# Patient Record
Sex: MALE | Race: ASIAN | NOT HISPANIC OR LATINO
[De-identification: names, ages, dates, MRNs, and addresses within clinical notes are randomized per-mention and may not be internally consistent; named-entity substitution may affect disease eponyms.]

---

## 2022-01-11 PROBLEM — Z00.00 ENCOUNTER FOR PREVENTIVE HEALTH EXAMINATION: Status: ACTIVE | Noted: 2022-01-11

## 2022-01-12 ENCOUNTER — APPOINTMENT (OUTPATIENT)
Dept: CARDIOLOGY | Facility: CLINIC | Age: 72
End: 2022-01-12
Payer: MEDICARE

## 2022-01-12 ENCOUNTER — NON-APPOINTMENT (OUTPATIENT)
Age: 72
End: 2022-01-12

## 2022-01-12 VITALS
WEIGHT: 186 LBS | TEMPERATURE: 98.1 F | RESPIRATION RATE: 18 BRPM | DIASTOLIC BLOOD PRESSURE: 85 MMHG | SYSTOLIC BLOOD PRESSURE: 123 MMHG | HEIGHT: 67.32 IN | BODY MASS INDEX: 28.85 KG/M2 | HEART RATE: 92 BPM | OXYGEN SATURATION: 97 %

## 2022-01-12 DIAGNOSIS — Z78.9 OTHER SPECIFIED HEALTH STATUS: ICD-10-CM

## 2022-01-12 DIAGNOSIS — Z82.3 FAMILY HISTORY OF STROKE: ICD-10-CM

## 2022-01-12 DIAGNOSIS — E78.5 HYPERLIPIDEMIA, UNSPECIFIED: ICD-10-CM

## 2022-01-12 DIAGNOSIS — Z87.891 PERSONAL HISTORY OF NICOTINE DEPENDENCE: ICD-10-CM

## 2022-01-12 PROCEDURE — 93015 CV STRESS TEST SUPVJ I&R: CPT

## 2022-01-12 PROCEDURE — 93306 TTE W/DOPPLER COMPLETE: CPT

## 2022-01-12 PROCEDURE — 99204 OFFICE O/P NEW MOD 45 MIN: CPT | Mod: 25

## 2022-01-12 PROCEDURE — 93000 ELECTROCARDIOGRAM COMPLETE: CPT | Mod: 59

## 2022-01-12 RX ORDER — TRAZODONE HYDROCHLORIDE 50 MG/1
50 TABLET ORAL
Refills: 0 | Status: ACTIVE | COMMUNITY

## 2022-01-12 RX ORDER — FINASTERIDE 5 MG/1
5 TABLET, FILM COATED ORAL
Refills: 0 | Status: ACTIVE | COMMUNITY

## 2022-01-12 RX ORDER — FENOFIBRATE 54 MG/1
54 TABLET ORAL
Refills: 0 | Status: ACTIVE | COMMUNITY

## 2022-01-12 RX ORDER — TAMSULOSIN HYDROCHLORIDE 0.4 MG/1
0.4 CAPSULE ORAL
Refills: 0 | Status: ACTIVE | COMMUNITY

## 2022-01-12 NOTE — HISTORY OF PRESENT ILLNESS
[FreeTextEntry1] : 71 year-old male with aortic aneurysm, HLD presents for evaluation of CP.  Patient reports that for the past 2 years he has been experiencing SSCP radiating to the neck, described as tightness,  worse with exertion, relieved with rest, lasting minutes.  Patient reports REYES.  Patient reports palpitations with exertion.  Patient denies h/o syncope.

## 2022-01-12 NOTE — PHYSICAL EXAM
[Well Developed] : well developed [Well Nourished] : well nourished [No Acute Distress] : no acute distress [Normal Conjunctiva] : normal conjunctiva [Normal Venous Pressure] : normal venous pressure [No Carotid Bruit] : no carotid bruit [Normal S1, S2] : normal S1, S2 [No Murmur] : no murmur [No Rub] : no rub [No Gallop] : no gallop [Clear Lung Fields] : clear lung fields [Good Air Entry] : good air entry [No Respiratory Distress] : no respiratory distress  [Soft] : abdomen soft [Non Tender] : non-tender [No Masses/organomegaly] : no masses/organomegaly [Normal Bowel Sounds] : normal bowel sounds [Normal Gait] : normal gait [No Cyanosis] : no cyanosis [No Clubbing] : no clubbing [No Varicosities] : no varicosities [Moves all extremities] : moves all extremities [No Focal Deficits] : no focal deficits [Normal Speech] : normal speech [Alert and Oriented] : alert and oriented [Normal memory] : normal memory [de-identified] : Trace edema noted.

## 2022-01-26 ENCOUNTER — APPOINTMENT (OUTPATIENT)
Dept: CARDIOLOGY | Facility: CLINIC | Age: 72
End: 2022-01-26
Payer: MEDICARE

## 2022-01-26 VITALS
TEMPERATURE: 97.7 F | WEIGHT: 186 LBS | HEART RATE: 77 BPM | SYSTOLIC BLOOD PRESSURE: 128 MMHG | RESPIRATION RATE: 18 BRPM | OXYGEN SATURATION: 94 % | DIASTOLIC BLOOD PRESSURE: 79 MMHG | BODY MASS INDEX: 28.85 KG/M2

## 2022-01-26 PROCEDURE — 99213 OFFICE O/P EST LOW 20 MIN: CPT

## 2022-02-08 NOTE — PHYSICAL EXAM
[Well Developed] : well developed [Well Nourished] : well nourished [No Acute Distress] : no acute distress [Normal Conjunctiva] : normal conjunctiva [Normal Venous Pressure] : normal venous pressure [No Carotid Bruit] : no carotid bruit [Normal S1, S2] : normal S1, S2 [No Murmur] : no murmur [No Rub] : no rub [No Gallop] : no gallop [Clear Lung Fields] : clear lung fields [Good Air Entry] : good air entry [No Respiratory Distress] : no respiratory distress  [Soft] : abdomen soft [Non Tender] : non-tender [No Masses/organomegaly] : no masses/organomegaly [Normal Bowel Sounds] : normal bowel sounds [Normal Gait] : normal gait [No Cyanosis] : no cyanosis [No Clubbing] : no clubbing [No Varicosities] : no varicosities [Moves all extremities] : moves all extremities [No Focal Deficits] : no focal deficits [Normal Speech] : normal speech [Alert and Oriented] : alert and oriented [Normal memory] : normal memory [de-identified] : Trace edema noted.

## 2022-02-08 NOTE — HISTORY OF PRESENT ILLNESS
[FreeTextEntry1] : 1/26/22 - Patient still reports CP and SOB going upstairs.  I advised patient to start NTPatch.  I will consider cardiac cath if his symptom persists.\par \par 1/12/22 - Patient reports that for the past 2 years he has been experiencing SSCP radiating to the neck, described as tightness,  worse with exertion, relieved with rest, lasting minutes.  Patient reports REYES.  Patient reports palpitations with exertion.  Patient denies h/o syncope.

## 2022-02-08 NOTE — REASON FOR VISIT
[FreeTextEntry1] : 71 year-old male with aortic aneurysm (4.6 cm), HLD presents for followup.  \par \par Patient was last seen on 1/26/22.   I advised patient to start [facility].  \par \par He is on ASA 81 mg and Atorvastatin 10 mg for CAD.  He is on Metoprolol ER 25 mg. \par \par Patient underwent an echocardiogram 1/12/22 and it showed normal LV function with moderate dilatation of the ascending aorta (4.6 cm).  \par \par Patient underwent a treadmill stress test 1/12/22 and completed 3 minutes of Sam protocol.  There were upsloping ST depressions on ECG but no symptoms.  Following treadmill stress, there was echocardiographic evidence of basal inferolateral ischemia.

## 2022-02-09 NOTE — PHYSICAL EXAM
[Well Developed] : well developed [Well Nourished] : well nourished [No Acute Distress] : no acute distress [Normal Conjunctiva] : normal conjunctiva [Normal Venous Pressure] : normal venous pressure [No Carotid Bruit] : no carotid bruit [Normal S1, S2] : normal S1, S2 [No Murmur] : no murmur [No Rub] : no rub [No Gallop] : no gallop [Clear Lung Fields] : clear lung fields [Good Air Entry] : good air entry [No Respiratory Distress] : no respiratory distress  [Soft] : abdomen soft [Non Tender] : non-tender [No Masses/organomegaly] : no masses/organomegaly [Normal Bowel Sounds] : normal bowel sounds [Normal Gait] : normal gait [No Cyanosis] : no cyanosis [No Clubbing] : no clubbing [No Varicosities] : no varicosities [Moves all extremities] : moves all extremities [No Focal Deficits] : no focal deficits [Normal Speech] : normal speech [Alert and Oriented] : alert and oriented [Normal memory] : normal memory [de-identified] : Trace edema noted.

## 2022-02-09 NOTE — REASON FOR VISIT
[FreeTextEntry1] : 71 year-old male with aortic aneurysm (4.6 cm), HLD presents for followup.  \par \par Patient was last seen on 1/12/22 for evaluation of CP.  Patient underwent an echocardiogram and it showed normal LV function with moderate dilatation of the ascending aorta (4.6 cm).  Patient underwent a treadmill stress test and completed 3 minutes of Sam protocol.  There were upsloping ST depressions on ECG but no symptoms.  Following treadmill stress, there was echocardiographic evidence of basal inferolateral ischemia. \par \par He is on ASA 81 mg and Atorvastatin 10 mg for CAD.  He is on Metoprolol ER 25 mg.

## 2022-02-14 ENCOUNTER — APPOINTMENT (OUTPATIENT)
Dept: CARDIOLOGY | Facility: CLINIC | Age: 72
End: 2022-02-14
Payer: MEDICARE

## 2022-02-14 VITALS
HEART RATE: 95 BPM | WEIGHT: 186 LBS | RESPIRATION RATE: 18 BRPM | SYSTOLIC BLOOD PRESSURE: 150 MMHG | TEMPERATURE: 97.2 F | BODY MASS INDEX: 28.85 KG/M2 | OXYGEN SATURATION: 95 % | DIASTOLIC BLOOD PRESSURE: 101 MMHG

## 2022-02-14 PROCEDURE — 99213 OFFICE O/P EST LOW 20 MIN: CPT

## 2022-04-13 ENCOUNTER — APPOINTMENT (OUTPATIENT)
Dept: CARDIOLOGY | Facility: CLINIC | Age: 72
End: 2022-04-13
Payer: MEDICARE

## 2022-04-13 VITALS
RESPIRATION RATE: 18 BRPM | WEIGHT: 181 LBS | DIASTOLIC BLOOD PRESSURE: 81 MMHG | BODY MASS INDEX: 28.08 KG/M2 | SYSTOLIC BLOOD PRESSURE: 120 MMHG | OXYGEN SATURATION: 97 % | HEART RATE: 74 BPM | TEMPERATURE: 98.5 F

## 2022-04-13 PROCEDURE — 99213 OFFICE O/P EST LOW 20 MIN: CPT

## 2022-04-13 NOTE — REASON FOR VISIT
[FreeTextEntry1] : 71 year-old male with possible CAD (abnormal stress echo), aortic aneurysm (4.6 cm), HTN, HLD presents for followup.  \par \par Patient was last seen on 2/14/22 for CP and SOB with exertion.  His BP and HR were elevated today.  I advised patient to increase Metoprolol ER to 50 mg.  \par \par He is on ASA 81 mg and Atorvastatin 10 mg for CAD.  He is on Metoprolol ER 50 mg for HTN.  He is on NTPatch for CP.\par \par Patient underwent an echocardiogram 1/12/22 and it showed normal LV function with moderate dilatation of the ascending aorta (4.6 cm).  \par \par Patient underwent a treadmill stress test 1/12/22 and completed 3 minutes of Sam protocol.  There were upsloping ST depressions on ECG but no symptoms.  Following treadmill stress, there was echocardiographic evidence of basal inferolateral ischemia.

## 2022-04-13 NOTE — HISTORY OF PRESENT ILLNESS
[FreeTextEntry1] : 4/13/22 - Patient reports CP and SOB with exertion despite medications.  Patient would like to proceed with cardiac cath.  I advised patient to continue current medications.  \par \par 2/14/22 - Patient reports CP and SOB when walking upstairs or walking fast.  He is using NTPatch daily.  His BP and HR were elevated today.  I advised patient to increase Metoprolol ER to 50 mg.  FU 2 months. \par \par 1/26/22 - Patient still reports CP and SOB going upstairs.  I advised patient to start NTPatch.  I will consider cardiac cath if his symptom persists.\par \par 1/12/22 - Patient reports that for the past 2 years he has been experiencing SSCP radiating to the neck, described as tightness,  worse with exertion, relieved with rest, lasting minutes.  Patient reports REYES.  Patient reports palpitations with exertion.  Patient denies h/o syncope.

## 2022-04-13 NOTE — PHYSICAL EXAM
[Well Developed] : well developed [Well Nourished] : well nourished [No Acute Distress] : no acute distress [Normal Conjunctiva] : normal conjunctiva [Normal Venous Pressure] : normal venous pressure [No Carotid Bruit] : no carotid bruit [Normal S1, S2] : normal S1, S2 [No Murmur] : no murmur [No Rub] : no rub [No Gallop] : no gallop [Clear Lung Fields] : clear lung fields [Good Air Entry] : good air entry [No Respiratory Distress] : no respiratory distress  [Soft] : abdomen soft [Non Tender] : non-tender [No Masses/organomegaly] : no masses/organomegaly [Normal Bowel Sounds] : normal bowel sounds [Normal Gait] : normal gait [No Cyanosis] : no cyanosis [No Clubbing] : no clubbing [No Varicosities] : no varicosities [Moves all extremities] : moves all extremities [No Focal Deficits] : no focal deficits [Normal Speech] : normal speech [Alert and Oriented] : alert and oriented [Normal memory] : normal memory [de-identified] : Trace edema noted.

## 2022-04-13 NOTE — DISCUSSION/SUMMARY
[FreeTextEntry1] : 71 year-old male with possible CAD (abnormal stress echo), aortic aneurysm (4.6 cm), HTN, HLD presents for followup.  \par \par Patient was last seen on 2/14/22 for CP and SOB with exertion.  His BP and HR were elevated today.  I advised patient to increase Metoprolol ER to 50 mg.  \par \par He is on ASA 81 mg and Atorvastatin 10 mg for CAD.  He is on Metoprolol ER 50 mg for HTN.  He is on NTPatch for CP.\par \par Patient underwent an echocardiogram 1/12/22 and it showed normal LV function with moderate dilatation of the ascending aorta (4.6 cm).  \par \par Patient underwent a treadmill stress test 1/12/22 and completed 3 minutes of Sam protocol.  There were upsloping ST depressions on ECG but no symptoms.  Following treadmill stress, there was echocardiographic evidence of basal inferolateral ischemia. \par \par 4/13/22 - Patient reports CP and SOB with exertion despite medications.  Patient would like to proceed with cardiac cath.  I advised patient to continue current medications.  \par \par (1) CP and SOB with exertion, abnormal stress test - Patient reports CP and SOB with exertion despite medications.  Patient would like to proceed with cardiac cath.  Patient requests [facility].  I advised patient to continue ASA 81 mg, Atorvastatin 10 mg, Metoprolol ER 50 mg, and NTPatch.\par \par (2) Followup - pending cardiac cath.

## 2022-04-27 ENCOUNTER — NON-APPOINTMENT (OUTPATIENT)
Age: 72
End: 2022-04-27

## 2022-04-30 PROBLEM — R94.39 ABNORMAL CARDIOVASCULAR STRESS TEST: Status: ACTIVE | Noted: 2022-02-08

## 2022-05-02 ENCOUNTER — APPOINTMENT (OUTPATIENT)
Dept: CARDIOLOGY | Facility: CLINIC | Age: 72
End: 2022-05-02
Payer: MEDICARE

## 2022-05-02 VITALS
DIASTOLIC BLOOD PRESSURE: 74 MMHG | SYSTOLIC BLOOD PRESSURE: 109 MMHG | WEIGHT: 189 LBS | OXYGEN SATURATION: 95 % | BODY MASS INDEX: 29.32 KG/M2 | TEMPERATURE: 98.4 F | HEART RATE: 75 BPM | RESPIRATION RATE: 18 BRPM

## 2022-05-02 DIAGNOSIS — R94.39 ABNORMAL RESULT OF OTHER CARDIOVASCULAR FUNCTION STUDY: ICD-10-CM

## 2022-05-02 PROCEDURE — 99213 OFFICE O/P EST LOW 20 MIN: CPT

## 2022-05-03 NOTE — HISTORY OF PRESENT ILLNESS
[FreeTextEntry1] : 5/2/22 - Patient reports decreased CP following stents.  I advised patient to continue ASA 81 mg and Plavix 75 mg.  I advised patient to increase Atorvastatin to 40 mg.  He may stop NTPatch and assess his symptoms.  FU 3 weeks.\par \par 4/13/22 - Patient reports CP and SOB with exertion despite medications.  Patient would like to proceed with cardiac cath.  I advised patient to continue current medications.  Cardiac cath today at [facility] with Dr. Arrington showed 70-80% pLAD, 70% mLAD, 95% pRCA, 99% mRCA, LVEDP 22 mmHg. Patient underwent RCA stents x 2. to consider atherectomy of LAD (heavily calcified). \par \par 2/14/22 - Patient reports CP and SOB when walking upstairs or walking fast.  He is using NTPatch daily.  His BP and HR were elevated today.  I advised patient to increase Metoprolol ER to 50 mg.  FU 2 months. \par \par 1/26/22 - Patient still reports CP and SOB going upstairs.  I advised patient to start NTPatch.  I will consider cardiac cath if his symptom persists.\par \par 1/12/22 - Patient reports that for the past 2 years he has been experiencing SSCP radiating to the neck, described as tightness,  worse with exertion, relieved with rest, lasting minutes.  Patient reports REYES.  Patient reports palpitations with exertion.  Patient denies h/o syncope.

## 2022-05-03 NOTE — PHYSICAL EXAM
[Well Developed] : well developed [Well Nourished] : well nourished [No Acute Distress] : no acute distress [Normal Conjunctiva] : normal conjunctiva [Normal Venous Pressure] : normal venous pressure [No Carotid Bruit] : no carotid bruit [Normal S1, S2] : normal S1, S2 [No Murmur] : no murmur [No Rub] : no rub [No Gallop] : no gallop [Clear Lung Fields] : clear lung fields [Good Air Entry] : good air entry [No Respiratory Distress] : no respiratory distress  [Soft] : abdomen soft [Non Tender] : non-tender [No Masses/organomegaly] : no masses/organomegaly [Normal Bowel Sounds] : normal bowel sounds [Normal Gait] : normal gait [No Cyanosis] : no cyanosis [No Clubbing] : no clubbing [No Varicosities] : no varicosities [Moves all extremities] : moves all extremities [No Focal Deficits] : no focal deficits [Normal Speech] : normal speech [Normal memory] : normal memory [Alert and Oriented] : alert and oriented [de-identified] : Trace edema noted.

## 2022-05-03 NOTE — REASON FOR VISIT
[FreeTextEntry1] : 71 year-old male with CAD (70-80% pLAD, 70% mLAD, 95% pRCA, 99% mRCA) s/p 2 RCA stents at [facility] on 4/27/22,, aortic aneurysm (4.6 cm), HTN, HLD presents for followup.  \par \par Patient was last seen on 4/13/22 for CP and SOB with exertion despite medications.  Patient would like to proceed with cardiac cath.  Cardiac cath today at [facility] with Dr. Arrington showed 70-80% pLAD, 70% mLAD, 95% pRCA, 99% mRCA, LVEDP 22 mmHg. Patient underwent RCA stents x 2. to consider atherectomy of LAD (heavily calcified). \par \par He is on ASA 81 mg and Atorvastatin 10 mg for CAD.  He is on Metoprolol ER 50 mg for HTN.  He is on NTPatch for CP.\par \par Patient underwent an echocardiogram 1/12/22 and it showed normal LV function with moderate dilatation of the ascending aorta (4.6 cm).  \par \par Patient underwent a treadmill stress test 1/12/22 and completed 3 minutes of Sam protocol.  There were upsloping ST depressions on ECG but no symptoms.  Following treadmill stress, there was echocardiographic evidence of basal inferolateral ischemia.

## 2022-05-24 NOTE — HISTORY OF PRESENT ILLNESS
[FreeTextEntry1] : 5/2/22 - Patient reports decreased CP following stents.  I advised patient to continue ASA 81 mg and Plavix 75 mg.  I advised patient to increase Atorvastatin to 40 mg.  He may stop NTPatch and assess his symptoms.  FU 3 weeks.\par \par 4/13/22 - Patient reports CP and SOB with exertion despite medications.  Patient would like to proceed with cardiac cath.  I advised patient to continue current medications.  Cardiac cath today at [facility] with Dr. Arrington showed 70-80% pLAD, 70% mLAD, 95% pRCA, 99% mRCA, LVEDP 22 mmHg. Patient underwent RCA stents x 2. to consider atherectomy of LAD (heavily calcified). \par \par 2/14/22 - Patient reports CP and SOB when walking upstairs or walking fast.  He is using NTPatch daily.  His BP and HR were elevated today.  I advised patient to increase Metoprolol ER to 50 mg.  FU 2 months. \par \par 1/26/22 - Patient still reports CP and SOB going upstairs.  I advised patient to start NTPatch.  I will consider cardiac cath if his symptom persists.\par \par 1/12/22 - Patient reports that for the past 2 years he has been experiencing SSCP radiating to the neck, described as tightness,  worse with exertion, relieved with rest, lasting minutes.  Patient reports REYES.  Patient reports palpitations with exertion.  Patient denies h/o syncope.

## 2022-05-24 NOTE — REASON FOR VISIT
[FreeTextEntry1] : 71 year-old male with CAD (70-80% pLAD, 70% mLAD, 95% pRCA, 99% mRCA) s/p 2 RCA stents at [facility] on 4/27/22,, aortic aneurysm (4.6 cm), HTN, HLD presents for followup.  \par \par Patient was last seen on 5/2/22 s/p stents.  I advised patient to continue ASA 81 mg and Plavix 75 mg.  I advised patient to increase Atorvastatin to 40 mg.  He may stop NTPatch and assess his symptoms.  \par \par He is on ASA 81 mg, Plavix 75 mg, Atorvastatin 40 mg for CAD.  He is on Metoprolol ER 50 mg for HTN. \par \par Patient underwent an echocardiogram 1/12/22 and it showed normal LV function with moderate dilatation of the ascending aorta (4.6 cm).  \par \par Patient underwent a treadmill stress test 1/12/22 and completed 3 minutes of Sam protocol.  There were upsloping ST depressions on ECG but no symptoms.  Following treadmill stress, there was echocardiographic evidence of basal inferolateral ischemia. \par \par Cardiac cath 4/27/22 at [facility] with Dr. Arrington showed 70-80% pLAD, 70% mLAD, 95% pRCA, 99% mRCA, LVEDP 22 mmHg. Patient underwent RCA stents x 2. to consider atherectomy of LAD (heavily calcified).

## 2022-05-24 NOTE — CARDIOLOGY SUMMARY
[de-identified] : NSR, no STTw changes.  [de-identified] : Cardiac cath 4/27/22 at [facility] with Dr. Arrington showed 70-80% pLAD, 70% mLAD, 95% pRCA, 99% mRCA, LVEDP 22 mmHg. Patient underwent RCA stents x 2. to consider atherectomy of LAD (heavily calcified).

## 2022-05-24 NOTE — PHYSICAL EXAM
[Well Developed] : well developed [Well Nourished] : well nourished [No Acute Distress] : no acute distress [Normal Conjunctiva] : normal conjunctiva [Normal Venous Pressure] : normal venous pressure [No Carotid Bruit] : no carotid bruit [Normal S1, S2] : normal S1, S2 [No Murmur] : no murmur [No Rub] : no rub [No Gallop] : no gallop [Clear Lung Fields] : clear lung fields [Good Air Entry] : good air entry [No Respiratory Distress] : no respiratory distress  [Soft] : abdomen soft [Non Tender] : non-tender [No Masses/organomegaly] : no masses/organomegaly [Normal Bowel Sounds] : normal bowel sounds [Normal Gait] : normal gait [No Cyanosis] : no cyanosis [No Clubbing] : no clubbing [No Varicosities] : no varicosities [Moves all extremities] : moves all extremities [No Focal Deficits] : no focal deficits [Normal Speech] : normal speech [Alert and Oriented] : alert and oriented [Normal memory] : normal memory [de-identified] : Trace edema noted.

## 2022-05-25 ENCOUNTER — APPOINTMENT (OUTPATIENT)
Dept: CARDIOLOGY | Facility: CLINIC | Age: 72
End: 2022-05-25
Payer: MEDICARE

## 2022-05-25 VITALS
OXYGEN SATURATION: 95 % | DIASTOLIC BLOOD PRESSURE: 76 MMHG | TEMPERATURE: 98.3 F | SYSTOLIC BLOOD PRESSURE: 115 MMHG | BODY MASS INDEX: 29.16 KG/M2 | WEIGHT: 188 LBS | HEART RATE: 69 BPM | RESPIRATION RATE: 18 BRPM

## 2022-05-25 PROCEDURE — 99213 OFFICE O/P EST LOW 20 MIN: CPT

## 2022-05-25 RX ORDER — NITROGLYCERIN 20 MG/1
0.1 PATCH TRANSDERMAL DAILY
Qty: 30 | Refills: 5 | Status: DISCONTINUED | COMMUNITY
Start: 2022-01-26 | End: 2022-05-25

## 2022-08-05 ENCOUNTER — APPOINTMENT (OUTPATIENT)
Dept: CARDIOLOGY | Facility: CLINIC | Age: 72
End: 2022-08-05

## 2022-08-05 ENCOUNTER — NON-APPOINTMENT (OUTPATIENT)
Age: 72
End: 2022-08-05

## 2022-08-05 VITALS
WEIGHT: 184 LBS | HEART RATE: 70 BPM | RESPIRATION RATE: 18 BRPM | TEMPERATURE: 97.9 F | DIASTOLIC BLOOD PRESSURE: 79 MMHG | OXYGEN SATURATION: 97 % | BODY MASS INDEX: 28.54 KG/M2 | SYSTOLIC BLOOD PRESSURE: 126 MMHG

## 2022-08-05 PROCEDURE — 93000 ELECTROCARDIOGRAM COMPLETE: CPT

## 2022-08-05 PROCEDURE — 99213 OFFICE O/P EST LOW 20 MIN: CPT | Mod: 25

## 2022-08-05 NOTE — PHYSICAL EXAM
[Well Developed] : well developed [Well Nourished] : well nourished [No Acute Distress] : no acute distress [Normal Conjunctiva] : normal conjunctiva [Normal Venous Pressure] : normal venous pressure [No Carotid Bruit] : no carotid bruit [Normal S1, S2] : normal S1, S2 [No Murmur] : no murmur [No Rub] : no rub [No Gallop] : no gallop [Clear Lung Fields] : clear lung fields [Good Air Entry] : good air entry [No Respiratory Distress] : no respiratory distress  [Soft] : abdomen soft [Non Tender] : non-tender [No Masses/organomegaly] : no masses/organomegaly [Normal Bowel Sounds] : normal bowel sounds [Normal Gait] : normal gait [No Cyanosis] : no cyanosis [No Clubbing] : no clubbing [No Varicosities] : no varicosities [Moves all extremities] : moves all extremities [No Focal Deficits] : no focal deficits [Normal Speech] : normal speech [Alert and Oriented] : alert and oriented [Normal memory] : normal memory [de-identified] : Trace edema noted.

## 2022-08-05 NOTE — CARDIOLOGY SUMMARY
[de-identified] : NSR, no STTw changes.  [de-identified] : Cardiac cath 4/27/22 at [facility] with Dr. Arrington showed 70-80% pLAD, 70% mLAD, 95% pRCA, 99% mRCA, LVEDP 22 mmHg. Patient underwent RCA stents x 2. to consider atherectomy of LAD (heavily calcified).

## 2022-08-05 NOTE — REASON FOR VISIT
[FreeTextEntry1] : 71 year-old male with CAD (70-80% pLAD, 70% mLAD, 95% pRCA, 99% mRCA) s/p 2 RCA stents at [facility] on 4/27/22,, aortic aneurysm (4.6 cm), HTN, HLD presents for followup.  \par \par Patient was last seen on 5/25/22 for followup.  \par \par He is on ASA 81 mg, Plavix 75 mg, Atorvastatin 40 mg for CAD.  He is on Metoprolol ER 50 mg for HTN. \par \par Patient underwent an echocardiogram 1/12/22 and it showed normal LV function with moderate dilatation of the ascending aorta (4.6 cm).  \par \par Patient underwent a treadmill stress test 1/12/22 and completed 3 minutes of Sam protocol.  There were upsloping ST depressions on ECG but no symptoms.  Following treadmill stress, there was echocardiographic evidence of basal inferolateral ischemia. \par \par Cardiac cath 4/27/22 at [facility] with Dr. Arrington showed 70-80% pLAD, 70% mLAD, 95% pRCA, 99% mRCA, LVEDP 22 mmHg. Patient underwent RCA stents x 2. to consider atherectomy of LAD (heavily calcified).

## 2022-08-19 ENCOUNTER — APPOINTMENT (OUTPATIENT)
Dept: CARDIOLOGY | Facility: CLINIC | Age: 72
End: 2022-08-19

## 2022-08-19 PROCEDURE — 93015 CV STRESS TEST SUPVJ I&R: CPT

## 2022-08-19 PROCEDURE — 78452 HT MUSCLE IMAGE SPECT MULT: CPT

## 2022-08-19 PROCEDURE — A9500: CPT

## 2022-08-22 ENCOUNTER — NON-APPOINTMENT (OUTPATIENT)
Age: 72
End: 2022-08-22

## 2022-12-12 ENCOUNTER — APPOINTMENT (OUTPATIENT)
Dept: CARDIOLOGY | Facility: CLINIC | Age: 72
End: 2022-12-12

## 2022-12-12 VITALS
SYSTOLIC BLOOD PRESSURE: 122 MMHG | HEART RATE: 62 BPM | DIASTOLIC BLOOD PRESSURE: 80 MMHG | TEMPERATURE: 96.8 F | BODY MASS INDEX: 29.01 KG/M2 | OXYGEN SATURATION: 95 % | WEIGHT: 187 LBS | RESPIRATION RATE: 18 BRPM

## 2022-12-12 DIAGNOSIS — G47.62 SLEEP RELATED LEG CRAMPS: ICD-10-CM

## 2022-12-12 DIAGNOSIS — R07.89 OTHER CHEST PAIN: ICD-10-CM

## 2022-12-12 PROCEDURE — 99214 OFFICE O/P EST MOD 30 MIN: CPT

## 2022-12-12 RX ORDER — MAGNESIUM OXIDE 241.3 MG/1000MG
400 TABLET ORAL
Qty: 30 | Refills: 1 | Status: ACTIVE | COMMUNITY
Start: 2022-12-12 | End: 1900-01-01

## 2022-12-12 NOTE — CARDIOLOGY SUMMARY
[de-identified] : NSR, no STTw changes.  [de-identified] : Cardiac cath 4/27/22 at [facility] with Dr. Arrington showed 70-80% pLAD, 70% mLAD, 95% pRCA, 99% mRCA, LVEDP 22 mmHg. Patient underwent RCA stents x 2. to consider atherectomy of LAD (heavily calcified).

## 2022-12-12 NOTE — PHYSICAL EXAM
[Well Developed] : well developed [Well Nourished] : well nourished [No Acute Distress] : no acute distress [Normal Conjunctiva] : normal conjunctiva [Normal Venous Pressure] : normal venous pressure [No Carotid Bruit] : no carotid bruit [Normal S1, S2] : normal S1, S2 [No Murmur] : no murmur [No Rub] : no rub [No Gallop] : no gallop [Clear Lung Fields] : clear lung fields [Good Air Entry] : good air entry [No Respiratory Distress] : no respiratory distress  [Soft] : abdomen soft [Non Tender] : non-tender [No Masses/organomegaly] : no masses/organomegaly [Normal Bowel Sounds] : normal bowel sounds [Normal Gait] : normal gait [No Cyanosis] : no cyanosis [No Clubbing] : no clubbing [No Varicosities] : no varicosities [Moves all extremities] : moves all extremities [No Focal Deficits] : no focal deficits [Normal Speech] : normal speech [Alert and Oriented] : alert and oriented [Normal memory] : normal memory [de-identified] : Trace edema noted.

## 2022-12-12 NOTE — REASON FOR VISIT
[FreeTextEntry1] : 72 year-old male with CAD (70-80% pLAD, 70% mLAD, 95% pRCA, 99% mRCA) s/p 2 RCA stents at [facility] on 4/27/22,, aortic aneurysm (4.6 cm), HTN, HLD, presents for followup.  \par \par Patient was last seen on 8/5//22 for followup. Patient underwent a pharmacologic nuclear stress test and it showed mild basal-mid inferior ischemia. I advised patient to continue current medications.  Cardiac cath would be considered if CP worse. \par \par He is on ASA 81 mg, Plavix 75 mg, Atorvastatin 40 mg for CAD.  He is on Metoprolol ER 50 mg for HTN. \par \par Patient underwent an echocardiogram 1/12/22 and it showed normal LV function with moderate dilatation of the ascending aorta (4.6 cm).  \par \par Patient underwent a treadmill stress test 1/12/22 and completed 3 minutes of Sam protocol.  There were upsloping ST depressions on ECG but no symptoms.  Following treadmill stress, there was echocardiographic evidence of basal inferolateral ischemia. \par \par Cardiac cath 4/27/22 at [facility] with Dr. Arrington showed 70-80% pLAD, 70% mLAD, 95% pRCA, 99% mRCA, LVEDP 22 mmHg. Patient underwent RCA stents x 2. to consider atherectomy of LAD (heavily calcified).

## 2022-12-12 NOTE — HISTORY OF PRESENT ILLNESS
[FreeTextEntry1] : 12/12/22 - Pt is here for recent abnormal chest CT.  It showed severe calcification of coronary artery.  Pt reports increased chest pain comparing to post-stent in April.  Pt reports stable SOB.  I advised patient to start Imdur 30 mg.  I will consider if symptom persists.  Patient reports nocturnal leg cramps.  I advised patient to try MgOxide.  FU 1 month. \par \par 8/5//22 - Patient underwent a pharmacologic nuclear stress test and it showed mild basal-mid inferior ischemia. I advised patient to continue current medications. Will consider cardiac cath if CP worse. \par \par 5/25/22 - Patient has been stable.  Patient denies CP.  Patient reports SOB with exertion.  Patient denies palpitations.  Patient denies lightheadedness.  Patient denies h/o syncope. I advised patient to continue current medications.  FU 2 months. \par \par 5/2/22 - Patient reports decreased CP following stents.  I advised patient to continue ASA 81 mg and Plavix 75 mg.  I advised patient to increase Atorvastatin to 40 mg.  He may stop NTPatch and assess his symptoms.  FU 3 weeks.\par \par 4/13/22 - Patient reports CP and SOB with exertion despite medications.  Patient would like to proceed with cardiac cath.  I advised patient to continue current medications.  Cardiac cath 4/27/22 at [facility] with Dr. Arrington showed 70-80% pLAD, 70% mLAD, 95% pRCA, 99% mRCA, LVEDP 22 mmHg. Patient underwent RCA stents x 2. to consider atherectomy of LAD (heavily calcified). \par \par 2/14/22 - Patient reports CP and SOB when walking upstairs or walking fast.  He is using NTPatch daily.  His BP and HR were elevated today.  I advised patient to increase Metoprolol ER to 50 mg.  FU 2 months. \par \par 1/26/22 - Patient still reports CP and SOB going upstairs.  I advised patient to start NTPatch.  I will consider cardiac cath if his symptom persists.\par \par 1/12/22 - Patient reports that for the past 2 years he has been experiencing SSCP radiating to the neck, described as tightness,  worse with exertion, relieved with rest, lasting minutes.  Patient reports REYES.  Patient reports palpitations with exertion.  Patient denies h/o syncope.

## 2023-01-10 ENCOUNTER — APPOINTMENT (OUTPATIENT)
Dept: CARDIOLOGY | Facility: CLINIC | Age: 73
End: 2023-01-10
Payer: MEDICARE

## 2023-01-10 VITALS
HEART RATE: 65 BPM | OXYGEN SATURATION: 96 % | TEMPERATURE: 97.8 F | RESPIRATION RATE: 18 BRPM | WEIGHT: 180 LBS | SYSTOLIC BLOOD PRESSURE: 118 MMHG | DIASTOLIC BLOOD PRESSURE: 80 MMHG | BODY MASS INDEX: 27.92 KG/M2

## 2023-01-10 PROCEDURE — 99213 OFFICE O/P EST LOW 20 MIN: CPT

## 2023-04-06 NOTE — REASON FOR VISIT
[FreeTextEntry1] : 72 year-old male with CAD (70-80% pLAD, 70% mLAD, 95% pRCA, 99% mRCA) s/p 2 RCA stents at [facility] on 4/27/22,, aortic aneurysm (4.6 cm), HTN, HLD, presents for followup.  \par \par Patient was last seen on 12/12/22 for recent abnormal chest CT.  It showed severe calcification of coronary artery.  Pt reported increased chest pain comparing to post-stent in April.  I advised patient to start Imdur 30 mg.  Patient reported nocturnal leg cramps.  I advised patient to try MgOxide.  \par \par He is on ASA 81 mg, Plavix 75 mg, Atorvastatin 40 mg for CAD.  He is on Metoprolol ER 50 mg for HTN.  He is on Imdur 30 mg for CP.\par \par Patient underwent a pharmacologic nuclear stress test 8/19/22 and it showed mild basal-mid inferior ischemia. \par \[facility] Patient underwent an echocardiogram 1/12/22 and it showed normal LV function with moderate dilatation of the ascending aorta (4.6 cm).  \par \par Patient underwent a treadmill stress test 1/12/22 and completed 3 minutes of Sam protocol.  There were upsloping ST depressions on ECG but no symptoms.  Following treadmill stress, there was echocardiographic evidence of basal inferolateral ischemia. \par \par Cardiac cath 4/27/22 at [facility] with Dr. Arrington showed 70-80% pLAD, 70% mLAD, 95% pRCA, 99% mRCA, LVEDP 22 mmHg. Patient underwent RCA stents x 2. to consider atherectomy of LAD (heavily calcified).

## 2023-04-06 NOTE — HISTORY OF PRESENT ILLNESS
[FreeTextEntry1] : 1/10/23 - Pt still reports exertional SOB. He didn't start Imdur 30 mg because he forgot it. He is on ASA 81 mg, Plavix 75 mg, Atorvastatin 40 mg and Metoprolol ER 50 mg. He is not using NTPatch. He is recently following up w. a Nephrologist. [place] He reports that he quit smoking. I advised patient to start on Imdur 30 mg QD. \par \par 12/12/22 - Pt is here for recent abnormal chest CT.  It showed severe calcification of coronary artery.  Pt reports increased chest pain comparing to post-stent in April.  Pt reports stable SOB.  I advised patient to start Imdur 30 mg.  I will consider if symptom persists.  Patient reports nocturnal leg cramps.  I advised patient to try MgOxide.  FU 1 month. \par \par 8/5//22 - Patient underwent a pharmacologic nuclear stress test and it showed mild basal-mid inferior ischemia. I advised patient to continue current medications. Will consider cardiac cath if CP worse. \par \par 5/25/22 - Patient has been stable.  Patient denies CP.  Patient reports SOB with exertion.  Patient denies palpitations.  Patient denies lightheadedness.  Patient denies h/o syncope. I advised patient to continue current medications.  FU 2 months. \par \par 5/2/22 - Patient reports decreased CP following stents.  I advised patient to continue ASA 81 mg and Plavix 75 mg.  I advised patient to increase Atorvastatin to 40 mg.  He may stop NTPatch and assess his symptoms.  FU 3 weeks.\par \par 4/13/22 - Patient reports CP and SOB with exertion despite medications.  Patient would like to proceed with cardiac cath.  I advised patient to continue current medications.  Cardiac cath 4/27/22 at [facility] with Dr. Arrington showed 70-80% pLAD, 70% mLAD, 95% pRCA, 99% mRCA, LVEDP 22 mmHg. Patient underwent RCA stents x 2. to consider atherectomy of LAD (heavily calcified). \par \par 2/14/22 - Patient reports CP and SOB when walking upstairs or walking fast.  He is using NTPatch daily.  His BP and HR were elevated today.  I advised patient to increase Metoprolol ER to 50 mg.  FU 2 months. \par \par 1/26/22 - Patient still reports CP and SOB going upstairs.  I advised patient to start NTPatch.  I will consider cardiac cath if his symptom persists.\par \par 1/12/22 - Patient reports that for the past 2 years he has been experiencing SSCP radiating to the neck, described as tightness,  worse with exertion, relieved with rest, lasting minutes.  Patient reports REYES.  Patient reports palpitations with exertion.  Patient denies h/o syncope.

## 2023-04-06 NOTE — CARDIOLOGY SUMMARY
[de-identified] : NSR, no STTw changes.  [de-identified] : Cardiac cath 4/27/22 at [facility] with Dr. Arrington showed 70-80% pLAD, 70% mLAD, 95% pRCA, 99% mRCA, LVEDP 22 mmHg. Patient underwent RCA stents x 2. to consider atherectomy of LAD (heavily calcified).

## 2023-04-06 NOTE — PHYSICAL EXAM
[Well Developed] : well developed [Well Nourished] : well nourished [No Acute Distress] : no acute distress [Normal Conjunctiva] : normal conjunctiva [Normal Venous Pressure] : normal venous pressure [No Carotid Bruit] : no carotid bruit [Normal S1, S2] : normal S1, S2 [No Murmur] : no murmur [No Rub] : no rub [No Gallop] : no gallop [Clear Lung Fields] : clear lung fields [Good Air Entry] : good air entry [No Respiratory Distress] : no respiratory distress  [Soft] : abdomen soft [Non Tender] : non-tender [No Masses/organomegaly] : no masses/organomegaly [Normal Bowel Sounds] : normal bowel sounds [Normal Gait] : normal gait [No Cyanosis] : no cyanosis [No Clubbing] : no clubbing [No Varicosities] : no varicosities [Moves all extremities] : moves all extremities [No Focal Deficits] : no focal deficits [Normal Speech] : normal speech [Alert and Oriented] : alert and oriented [Normal memory] : normal memory [de-identified] : Trace edema noted.

## 2023-04-25 NOTE — REASON FOR VISIT
[FreeTextEntry1] : 72 year-old male with CAD (70-80% pLAD, 70% mLAD, 95% pRCA, 99% mRCA) s/p 2 RCA stents at [facility] on 4/27/22,, aortic aneurysm (4.6 cm), HTN, HLD, presents for followup.  \par \par Patient was last seen on 1/10/23 for followup.  Pt still reported exertional SOB. He didn't start Imdur 30 mg because he forgot it.  [place]. He reported that he quit smoking.  I advised patient to start on Imdur 30 mg QD. \par \par He is on ASA 81 mg, Plavix 75 mg, Atorvastatin 40 mg for CAD.  He is on Metoprolol ER 50 mg for HTN.  He is on Imdur 30 mg for CP.\par \par Patient underwent a pharmacologic nuclear stress test 8/19/22 and it showed mild basal-mid inferior ischemia. \par \[facility] Patient underwent an echocardiogram 1/12/22 and it showed normal LV function with moderate dilatation of the ascending aorta (4.6 cm).  \par \par Patient underwent a treadmill stress test 1/12/22 and completed 3 minutes of Sam protocol.  There were upsloping ST depressions on ECG but no symptoms.  Following treadmill stress, there was echocardiographic evidence of basal inferolateral ischemia. \par \par Cardiac cath 4/27/22 at [facility] with Dr. Arrington showed 70-80% pLAD, 70% mLAD, 95% pRCA, 99% mRCA, LVEDP 22 mmHg. Patient underwent RCA stents x 2. to consider atherectomy of LAD (heavily calcified).

## 2023-04-25 NOTE — CARDIOLOGY SUMMARY
[de-identified] : NSR, no STTw changes.  [de-identified] : Cardiac cath 4/27/22 at [facility] with Dr. Arrington showed 70-80% pLAD, 70% mLAD, 95% pRCA, 99% mRCA, LVEDP 22 mmHg. Patient underwent RCA stents x 2. to consider atherectomy of LAD (heavily calcified).

## 2023-04-25 NOTE — PHYSICAL EXAM
[Well Developed] : well developed [Well Nourished] : well nourished [No Acute Distress] : no acute distress [Normal Conjunctiva] : normal conjunctiva [Normal Venous Pressure] : normal venous pressure [No Carotid Bruit] : no carotid bruit [Normal S1, S2] : normal S1, S2 [No Murmur] : no murmur [No Rub] : no rub [No Gallop] : no gallop [Clear Lung Fields] : clear lung fields [Good Air Entry] : good air entry [No Respiratory Distress] : no respiratory distress  [Soft] : abdomen soft [Non Tender] : non-tender [No Masses/organomegaly] : no masses/organomegaly [Normal Bowel Sounds] : normal bowel sounds [Normal Gait] : normal gait [No Cyanosis] : no cyanosis [No Clubbing] : no clubbing [No Varicosities] : no varicosities [Moves all extremities] : moves all extremities [No Focal Deficits] : no focal deficits [Normal Speech] : normal speech [Alert and Oriented] : alert and oriented [Normal memory] : normal memory [de-identified] : Trace edema noted.

## 2023-04-25 NOTE — HISTORY OF PRESENT ILLNESS
[FreeTextEntry1] : 1/10/23 - Pt still reports exertional SOB. He didn't start Imdur 30 mg because he forgot it. He is on ASA 81 mg, Plavix 75 mg, Atorvastatin 40 mg and Metoprolol ER 50 mg. He is not using NTPatch. He is recently following up w. a Nephrologist. Patient has rales at base. He reports that he quit smoking. I advised patient to start on Imdur 30 mg QD. \par \par 12/12/22 - Pt is here for recent abnormal chest CT.  It showed severe calcification of coronary artery.  Pt reports increased chest pain comparing to post-stent in April.  Pt reports stable SOB.  I advised patient to start Imdur 30 mg.  I will consider if symptom persists.  Patient reports nocturnal leg cramps.  I advised patient to try MgOxide.  FU 1 month. \par \par 8/5//22 - Patient underwent a pharmacologic nuclear stress test and it showed mild basal-mid inferior ischemia. I advised patient to continue current medications. Will consider cardiac cath if CP worse. \par \par 5/25/22 - Patient has been stable.  Patient denies CP.  Patient reports SOB with exertion.  Patient denies palpitations.  Patient denies lightheadedness.  Patient denies h/o syncope. I advised patient to continue current medications.  FU 2 months. \par \par 5/2/22 - Patient reports decreased CP following stents.  I advised patient to continue ASA 81 mg and Plavix 75 mg.  I advised patient to increase Atorvastatin to 40 mg.  He may stop NTPatch and assess his symptoms.  FU 3 weeks.\par \par 4/13/22 - Patient reports CP and SOB with exertion despite medications.  Patient would like to proceed with cardiac cath.  I advised patient to continue current medications.  Cardiac cath 4/27/22 at [facility] with Dr. Arrington showed 70-80% pLAD, 70% mLAD, 95% pRCA, 99% mRCA, LVEDP 22 mmHg. Patient underwent RCA stents x 2. to consider atherectomy of LAD (heavily calcified). \par \par 2/14/22 - Patient reports CP and SOB when walking upstairs or walking fast.  He is using NTPatch daily.  His BP and HR were elevated today.  I advised patient to increase Metoprolol ER to 50 mg.  FU 2 months. \par \par 1/26/22 - Patient still reports CP and SOB going upstairs.  I advised patient to start NTPatch.  I will consider cardiac cath if his symptom persists.\par \par 1/12/22 - Patient reports that for the past 2 years he has been experiencing SSCP radiating to the neck, described as tightness,  worse with exertion, relieved with rest, lasting minutes.  Patient reports REYES.  Patient reports palpitations with exertion.  Patient denies h/o syncope.

## 2023-04-27 ENCOUNTER — APPOINTMENT (OUTPATIENT)
Dept: CARDIOLOGY | Facility: CLINIC | Age: 73
End: 2023-04-27
Payer: MEDICARE

## 2023-04-27 ENCOUNTER — NON-APPOINTMENT (OUTPATIENT)
Age: 73
End: 2023-04-27

## 2023-04-27 VITALS
BODY MASS INDEX: 28.7 KG/M2 | SYSTOLIC BLOOD PRESSURE: 127 MMHG | HEART RATE: 68 BPM | WEIGHT: 185 LBS | TEMPERATURE: 97.5 F | OXYGEN SATURATION: 95 % | DIASTOLIC BLOOD PRESSURE: 79 MMHG | RESPIRATION RATE: 18 BRPM

## 2023-04-27 DIAGNOSIS — M79.10 MYALGIA, UNSPECIFIED SITE: ICD-10-CM

## 2023-04-27 PROCEDURE — 99213 OFFICE O/P EST LOW 20 MIN: CPT | Mod: 25

## 2023-04-27 PROCEDURE — 93000 ELECTROCARDIOGRAM COMPLETE: CPT

## 2023-04-27 RX ORDER — UBIDECARENONE 100 MG
100 CAPSULE ORAL
Qty: 30 | Refills: 5 | Status: ACTIVE | COMMUNITY
Start: 2023-04-27 | End: 1900-01-01

## 2023-07-26 ENCOUNTER — APPOINTMENT (OUTPATIENT)
Dept: CARDIOLOGY | Facility: CLINIC | Age: 73
End: 2023-07-26
Payer: MEDICARE

## 2023-07-26 VITALS
HEART RATE: 63 BPM | OXYGEN SATURATION: 94 % | TEMPERATURE: 96.8 F | WEIGHT: 181 LBS | BODY MASS INDEX: 28.08 KG/M2 | SYSTOLIC BLOOD PRESSURE: 116 MMHG | DIASTOLIC BLOOD PRESSURE: 78 MMHG | RESPIRATION RATE: 18 BRPM

## 2023-07-26 DIAGNOSIS — G47.33 OBSTRUCTIVE SLEEP APNEA (ADULT) (PEDIATRIC): ICD-10-CM

## 2023-07-26 DIAGNOSIS — J43.9 EMPHYSEMA, UNSPECIFIED: ICD-10-CM

## 2023-07-26 DIAGNOSIS — R06.02 SHORTNESS OF BREATH: ICD-10-CM

## 2023-07-26 DIAGNOSIS — Z99.89 OBSTRUCTIVE SLEEP APNEA (ADULT) (PEDIATRIC): ICD-10-CM

## 2023-07-26 PROCEDURE — 99214 OFFICE O/P EST MOD 30 MIN: CPT

## 2023-07-26 PROCEDURE — 93306 TTE W/DOPPLER COMPLETE: CPT

## 2023-07-26 RX ORDER — ASPIRIN ENTERIC COATED TABLETS 81 MG 81 MG/1
81 TABLET, DELAYED RELEASE ORAL
Qty: 90 | Refills: 1 | Status: ACTIVE | COMMUNITY
Start: 1900-01-01 | End: 1900-01-01

## 2023-07-26 NOTE — PHYSICAL EXAM
[Well Developed] : well developed [Well Nourished] : well nourished [No Acute Distress] : no acute distress [Normal Conjunctiva] : normal conjunctiva [Normal Venous Pressure] : normal venous pressure [No Carotid Bruit] : no carotid bruit [Normal S1, S2] : normal S1, S2 [No Murmur] : no murmur [No Rub] : no rub [No Gallop] : no gallop [Clear Lung Fields] : clear lung fields [Good Air Entry] : good air entry [No Respiratory Distress] : no respiratory distress  [Soft] : abdomen soft [Non Tender] : non-tender [No Masses/organomegaly] : no masses/organomegaly [Normal Bowel Sounds] : normal bowel sounds [Normal Gait] : normal gait [No Cyanosis] : no cyanosis [No Clubbing] : no clubbing [No Varicosities] : no varicosities [Moves all extremities] : moves all extremities [No Focal Deficits] : no focal deficits [Normal Speech] : normal speech [Alert and Oriented] : alert and oriented [Normal memory] : normal memory [de-identified] : Trace edema noted.

## 2023-07-26 NOTE — HISTORY OF PRESENT ILLNESS
[FreeTextEntry1] : 7/26/23 - Patient is on CPAP at night. He reports unable to breath during the day nap without CPAP while sleeping on his back. He reports SOB ass. w. CP ith mild activities. He is on inhaler.  He would like to find out if his SOB is due to CAD (still has heavily calcified LAD stenosis).  I advised patient to undergo an echocardiogram.  I advised patient to obtain BT for ProBNP.  I advised patient to undergo cardiac stress PET to determine the clinical significance of the LAD stenosis.\par \par 4/27/23 - Patient reports REYES.  Patient denies CP.  ECG showed no ischemic changes.  BT showed normal BNP and elevated d-dimer.  Chest CTA showed no PE but with scarring and bronchiectasis.  Patient reports that his SOB is not severe enough to warrant atherectomy of LAD yet.  I advised patient to continue current medications.  FU 3 months. \par \par 1/10/23 - Pt still reports exertional SOB. He didn't start Imdur 30 mg because he forgot it. He is on ASA 81 mg, Plavix 75 mg, Atorvastatin 40 mg and Metoprolol ER 50 mg. He is not using NTPatch. He is recently following up w. a Nephrologist. Patient has rales at base. He reports that he quit smoking. I advised patient to start on Imdur 30 mg QD. \par \par 12/12/22 - Pt is here for recent abnormal chest CT.  It showed severe calcification of coronary artery.  Pt reports increased chest pain comparing to post-stent in April.  Pt reports stable SOB.  I advised patient to start Imdur 30 mg.  I will consider if symptom persists.  Patient reports nocturnal leg cramps.  I advised patient to try MgOxide.  FU 1 month. \par \par 8/5//22 - Patient underwent a pharmacologic nuclear stress test and it showed mild basal-mid inferior ischemia. I advised patient to continue current medications. Will consider cardiac cath if CP worse. \par \par 5/25/22 - Patient has been stable.  Patient denies CP.  Patient reports SOB with exertion.  Patient denies palpitations.  Patient denies lightheadedness.  Patient denies h/o syncope. I advised patient to continue current medications.  FU 2 months. \par \par 5/2/22 - Patient reports decreased CP following stents.  I advised patient to continue ASA 81 mg and Plavix 75 mg.  I advised patient to increase Atorvastatin to 40 mg.  He may stop NTPatch and assess his symptoms.  FU 3 weeks.\par \par 4/13/22 - Patient reports CP and SOB with exertion despite medications.  Patient would like to proceed with cardiac cath.  I advised patient to continue current medications.  Cardiac cath 4/27/22 at [facility] with Dr. Arrington showed 70-80% pLAD, 70% mLAD, 95% pRCA, 99% mRCA, LVEDP 22 mmHg. Patient underwent RCA stents x 2. to consider atherectomy of LAD (heavily calcified). \par \par 2/14/22 - Patient reports CP and SOB when walking upstairs or walking fast.  He is using NTPatch daily.  His BP and HR were elevated today.  I advised patient to increase Metoprolol ER to 50 mg.  FU 2 months. \par \par 1/26/22 - Patient still reports CP and SOB going upstairs.  I advised patient to start NTPatch.  I will consider cardiac cath if his symptom persists.\par \par 1/12/22 - Patient reports that for the past 2 years he has been experiencing SSCP radiating to the neck, described as tightness,  worse with exertion, relieved with rest, lasting minutes.  Patient reports REYES.  Patient reports palpitations with exertion.  Patient denies h/o syncope.

## 2023-07-26 NOTE — REASON FOR VISIT
[FreeTextEntry1] : 72 year-old male with CAD (70-80% pLAD, 70% mLAD, 95% pRCA, 99% mRCA) s/p 2 RCA stents at [facility] on 4/27/22,, aortic aneurysm (4.6 cm), HTN, HLD, presents for followup.  \par \par Patient was last seen on 4/27/23 - Patient reports REYES.  Patient denies CP.  ECG showed no ischemic changes.  BT showed normal BNP and elevated d-dimer.  Chest CTA showed no PE but with scarring and bronchiectasis.  Patient reports that his SOB is not severe enough to warrant atherectomy of LAD yet.  I advised patient to continue current medications.  FU 3 months. \par \par He is on ASA 81 mg, Plavix 75 mg, Atorvastatin 40 mg for CAD.  He is on Metoprolol ER 50 mg for HTN.  He is on Imdur 30 mg for CP.\par \par Patient underwent a pharmacologic nuclear stress test 8/19/22 and it showed mild basal-mid inferior ischemia. \par \par Cardiac cath 4/27/22 at [facility] with Dr. Arrington showed 70-80% pLAD, 70% mLAD, 95% pRCA, 99% mRCA, LVEDP 22 mmHg. Patient underwent RCA stents x 2.  To consider atherectomy of LAD (heavily calcified). \par \par Patient underwent an echocardiogram 1/12/22 and it showed normal LV function with moderate dilatation of the ascending aorta (4.6 cm).  \par \par Patient underwent a treadmill stress test 1/12/22 and completed 3 minutes of Sam protocol.  There were upsloping ST depressions on ECG but no symptoms.  Following treadmill stress, there was echocardiographic evidence of basal inferolateral ischemia. \par

## 2023-07-26 NOTE — CARDIOLOGY SUMMARY
[de-identified] : NSR, no STTw changes.  [de-identified] : Cardiac cath 4/27/22 at [facility] with Dr. Arrington showed 70-80% pLAD, 70% mLAD, 95% pRCA, 99% mRCA, LVEDP 22 mmHg. Patient underwent RCA stents x 2. to consider atherectomy of LAD (heavily calcified).

## 2023-07-27 LAB
ALBUMIN SERPL ELPH-MCNC: 4.3 G/DL
ALP BLD-CCNC: 58 U/L
ALT SERPL-CCNC: 24 U/L
ANION GAP SERPL CALC-SCNC: 12 MMOL/L
AST SERPL-CCNC: 29 U/L
BILIRUB SERPL-MCNC: 0.9 MG/DL
BUN SERPL-MCNC: 20 MG/DL
CALCIUM SERPL-MCNC: 9.8 MG/DL
CHLORIDE SERPL-SCNC: 109 MMOL/L
CO2 SERPL-SCNC: 23 MMOL/L
CREAT SERPL-MCNC: 1.42 MG/DL
DEPRECATED D DIMER PPP IA-ACNC: 315 NG/ML DDU
EGFR: 52 ML/MIN/1.73M2
GLUCOSE SERPL-MCNC: 108 MG/DL
NT-PROBNP SERPL-MCNC: 74 PG/ML
POTASSIUM SERPL-SCNC: 4.8 MMOL/L
PROT SERPL-MCNC: 7.2 G/DL
SODIUM SERPL-SCNC: 144 MMOL/L

## 2023-07-31 ENCOUNTER — NON-APPOINTMENT (OUTPATIENT)
Age: 73
End: 2023-07-31

## 2023-10-24 ENCOUNTER — APPOINTMENT (OUTPATIENT)
Dept: CARDIOLOGY | Facility: CLINIC | Age: 73
End: 2023-10-24
Payer: MEDICARE

## 2023-10-24 VITALS
RESPIRATION RATE: 18 BRPM | HEART RATE: 71 BPM | OXYGEN SATURATION: 97 % | SYSTOLIC BLOOD PRESSURE: 108 MMHG | TEMPERATURE: 98 F | DIASTOLIC BLOOD PRESSURE: 70 MMHG | BODY MASS INDEX: 27.92 KG/M2 | WEIGHT: 180 LBS

## 2023-10-24 DIAGNOSIS — K08.89 OTHER SPECIFIED DISORDERS OF TEETH AND SUPPORTING STRUCTURES: ICD-10-CM

## 2023-10-24 PROCEDURE — 99214 OFFICE O/P EST MOD 30 MIN: CPT

## 2023-10-24 RX ORDER — AMOXICILLIN 500 MG/1
500 TABLET, FILM COATED ORAL 3 TIMES DAILY
Qty: 21 | Refills: 0 | Status: ACTIVE | COMMUNITY
Start: 2023-10-24 | End: 1900-01-01

## 2023-12-04 ENCOUNTER — APPOINTMENT (OUTPATIENT)
Dept: UROLOGY | Facility: CLINIC | Age: 73
End: 2023-12-04
Payer: MEDICARE

## 2023-12-04 ENCOUNTER — LABORATORY RESULT (OUTPATIENT)
Age: 73
End: 2023-12-04

## 2023-12-04 VITALS
RESPIRATION RATE: 18 BRPM | DIASTOLIC BLOOD PRESSURE: 72 MMHG | WEIGHT: 182 LBS | SYSTOLIC BLOOD PRESSURE: 117 MMHG | HEART RATE: 76 BPM | BODY MASS INDEX: 28.56 KG/M2 | TEMPERATURE: 97.8 F | OXYGEN SATURATION: 97 % | HEIGHT: 67 IN

## 2023-12-04 DIAGNOSIS — N18.9 CHRONIC KIDNEY DISEASE, UNSPECIFIED: ICD-10-CM

## 2023-12-04 PROCEDURE — 99204 OFFICE O/P NEW MOD 45 MIN: CPT

## 2023-12-06 ENCOUNTER — APPOINTMENT (OUTPATIENT)
Age: 73
End: 2023-12-06

## 2023-12-06 LAB
APPEARANCE: CLEAR
BILIRUBIN URINE: ABNORMAL
BLOOD URINE: NEGATIVE
COLOR: NORMAL
GLUCOSE QUALITATIVE U: NEGATIVE MG/DL
KETONES URINE: ABNORMAL MG/DL
LEUKOCYTE ESTERASE URINE: ABNORMAL
NITRITE URINE: NEGATIVE
PH URINE: 5.5
PROTEIN URINE: NEGATIVE MG/DL
SPECIFIC GRAVITY URINE: 1.02
URINE CYTOLOGY: NORMAL
UROBILINOGEN URINE: 0.2 MG/DL

## 2023-12-28 ENCOUNTER — APPOINTMENT (OUTPATIENT)
Dept: UROLOGY | Facility: CLINIC | Age: 73
End: 2023-12-28
Payer: MEDICARE

## 2023-12-28 VITALS
TEMPERATURE: 98.1 F | HEIGHT: 67 IN | BODY MASS INDEX: 28.41 KG/M2 | RESPIRATION RATE: 18 BRPM | WEIGHT: 181 LBS | OXYGEN SATURATION: 93 % | DIASTOLIC BLOOD PRESSURE: 80 MMHG | HEART RATE: 67 BPM | SYSTOLIC BLOOD PRESSURE: 145 MMHG

## 2023-12-28 DIAGNOSIS — R31.0 GROSS HEMATURIA: ICD-10-CM

## 2023-12-28 PROCEDURE — 52000 CYSTOURETHROSCOPY: CPT

## 2024-01-04 ENCOUNTER — NON-APPOINTMENT (OUTPATIENT)
Age: 74
End: 2024-01-04

## 2024-01-23 RX ORDER — CLOPIDOGREL BISULFATE 75 MG/1
75 TABLET, FILM COATED ORAL
Qty: 90 | Refills: 1 | Status: ACTIVE | COMMUNITY
Start: 2022-04-27 | End: 1900-01-01

## 2024-01-23 RX ORDER — METOPROLOL SUCCINATE 50 MG/1
50 TABLET, EXTENDED RELEASE ORAL DAILY
Qty: 90 | Refills: 1 | Status: ACTIVE | COMMUNITY
Start: 2022-01-12 | End: 1900-01-01

## 2024-01-23 RX ORDER — ISOSORBIDE MONONITRATE 30 MG/1
30 TABLET, EXTENDED RELEASE ORAL DAILY
Qty: 90 | Refills: 1 | Status: ACTIVE | COMMUNITY
Start: 2022-12-12 | End: 1900-01-01

## 2024-03-28 ENCOUNTER — APPOINTMENT (OUTPATIENT)
Dept: CARDIOLOGY | Facility: CLINIC | Age: 74
End: 2024-03-28
Payer: MEDICARE

## 2024-03-28 ENCOUNTER — NON-APPOINTMENT (OUTPATIENT)
Age: 74
End: 2024-03-28

## 2024-03-28 VITALS
HEART RATE: 67 BPM | WEIGHT: 180 LBS | DIASTOLIC BLOOD PRESSURE: 71 MMHG | OXYGEN SATURATION: 92 % | BODY MASS INDEX: 28.19 KG/M2 | TEMPERATURE: 98.5 F | SYSTOLIC BLOOD PRESSURE: 113 MMHG | RESPIRATION RATE: 18 BRPM

## 2024-03-28 PROCEDURE — 99214 OFFICE O/P EST MOD 30 MIN: CPT | Mod: 25

## 2024-03-28 PROCEDURE — 93000 ELECTROCARDIOGRAM COMPLETE: CPT

## 2024-03-28 RX ORDER — NITROGLYCERIN 0.4 MG/1
0.4 TABLET SUBLINGUAL
Qty: 1 | Refills: 0 | Status: ACTIVE | COMMUNITY
Start: 2024-03-28 | End: 1900-01-01

## 2024-04-25 NOTE — HISTORY OF PRESENT ILLNESS
[FreeTextEntry1] : 3/28/24 - Patient reports left-sided CP worse with exertion.  He is on ASA 81 mg, Plavix 75 mg, Atorvastatin 40 mg for CAD.  He is on Metoprolol ER 50 mg for HTN.  He is on Imdur 30 mg for CP.  /71 HR 67.  Exam showed rales at bases and trace LLE edema.  ECG showed no ischemic changes.  I advised patient to undergo a cardiac cath for possible atherectomy of the LAD.  He will think about it.  I advised patient to make sure that he is still taking Imdur 30 mg.  10/24/23 - Patient returns for followup.  Patient denies CP.  Patient reports stable SOB.  Patient denies palpitations.  He is on ASA 81 mg, Plavix 75 mg, Atorvastatin 40 mg for CAD.  He is on Metoprolol ER 50 mg for HTN.  He is on Imdur 30 mg for CP.  I advised patient to continue current medications.  FU 6 months.   7/26/23 - Patient is on CPAP at night. He reports unable to breath during the day nap without CPAP while sleeping on his back. He reports SOB ass. w. CP ith mild activities. He is on inhaler.  He would like to find out if his SOB is due to CAD (still has heavily calcified LAD stenosis).   I advised patient to undergo an echocardiogram.  I advised patient to obtain BT for ProBNP (74).  I advised patient to undergo cardiac stress PET to determine the clinical significance of the LAD stenosis.  Patient underwent an echocardiogram and it showed normal LV function, mild aortic root dilatation (4.1 cm), with mild AR.  Cardiac stress PET showed no perfusion defect.  FFR below 0.6  in RCA territory.    4/27/23 - Patient reports REYES.  Patient denies CP.  ECG showed no ischemic changes.  BT showed normal BNP and elevated d-dimer.  Chest CTA showed no PE but with scarring and bronchiectasis.  Patient reports that his SOB is not severe enough to warrant atherectomy of LAD yet.  I advised patient to continue current medications.  FU 3 months.   1/10/23 - Pt still reports exertional SOB. He didn't start Imdur 30 mg because he forgot it. He is on ASA 81 mg, Plavix 75 mg, Atorvastatin 40 mg and Metoprolol ER 50 mg. He is not using NTPatch. He is recently following up w. a Nephrologist. Patient has rales at base. He reports that he quit smoking. I advised patient to start on Imdur 30 mg QD.   12/12/22 - Pt is here for recent abnormal chest CT.  It showed severe calcification of coronary artery.  Pt reports increased chest pain comparing to post-stent in April.  Pt reports stable SOB.  I advised patient to start Imdur 30 mg.  I will consider if symptom persists.  Patient reports nocturnal leg cramps.  I advised patient to try MgOxide.  FU 1 month.   8/5//22 - Patient underwent a pharmacologic nuclear stress test and it showed mild basal-mid inferior ischemia. I advised patient to continue current medications. Will consider cardiac cath if CP worse.   5/25/22 - Patient has been stable.  Patient denies CP.  Patient reports SOB with exertion.  Patient denies palpitations.  Patient denies lightheadedness.  Patient denies h/o syncope. I advised patient to continue current medications.  FU 2 months.   5/2/22 - Patient reports decreased CP following stents.  I advised patient to continue ASA 81 mg and Plavix 75 mg.  I advised patient to increase Atorvastatin to 40 mg.  He may stop NTPatch and assess his symptoms.  FU 3 weeks.  4/13/22 - Patient reports CP and SOB with exertion despite medications.  Patient would like to proceed with cardiac cath.  I advised patient to continue current medications.  Cardiac cath 4/27/22 at [facility] with Dr. Arrington showed 70-80% pLAD, 70% mLAD, 95% pRCA, 99% mRCA, LVEDP 22 mmHg. Patient underwent RCA stents x 2. to consider atherectomy of LAD (heavily calcified).   2/14/22 - Patient reports CP and SOB when walking upstairs or walking fast.  He is using NTPatch daily.  His BP and HR were elevated today.  I advised patient to increase Metoprolol ER to 50 mg.  FU 2 months.   1/26/22 - Patient still reports CP and SOB going upstairs.  I advised patient to start NTPatch.  I will consider cardiac cath if his symptom persists.  1/12/22 - Patient reports that for the past 2 years he has been experiencing SSCP radiating to the neck, described as tightness,  worse with exertion, relieved with rest, lasting minutes.  Patient reports REYES.  Patient reports palpitations with exertion.  Patient denies h/o syncope.

## 2024-04-25 NOTE — PHYSICAL EXAM
[Well Developed] : well developed [Well Nourished] : well nourished [No Acute Distress] : no acute distress [Normal Conjunctiva] : normal conjunctiva [No Carotid Bruit] : no carotid bruit [Normal Venous Pressure] : normal venous pressure [Normal S1, S2] : normal S1, S2 [No Murmur] : no murmur [No Rub] : no rub [No Gallop] : no gallop [Clear Lung Fields] : clear lung fields [Good Air Entry] : good air entry [No Respiratory Distress] : no respiratory distress  [Soft] : abdomen soft [Non Tender] : non-tender [No Masses/organomegaly] : no masses/organomegaly [Normal Bowel Sounds] : normal bowel sounds [Normal Gait] : normal gait [No Cyanosis] : no cyanosis [No Varicosities] : no varicosities [No Clubbing] : no clubbing [No Focal Deficits] : no focal deficits [Moves all extremities] : moves all extremities [Alert and Oriented] : alert and oriented [Normal Speech] : normal speech [Normal memory] : normal memory [de-identified] : Trace edema noted.

## 2024-04-25 NOTE — CARDIOLOGY SUMMARY
[de-identified] : NSR, no STTw changes.  [de-identified] : Cardiac cath 4/27/22 at [facility] with Dr. Arrington showed 70-80% pLAD, 70% mLAD, 95% pRCA, 99% mRCA, LVEDP 22 mmHg. Patient underwent RCA stents x 2. to consider atherectomy of LAD (heavily calcified).

## 2024-05-11 ENCOUNTER — APPOINTMENT (OUTPATIENT)
Dept: CARDIOLOGY | Facility: CLINIC | Age: 74
End: 2024-05-11
Payer: MEDICARE

## 2024-05-11 VITALS
RESPIRATION RATE: 16 BRPM | OXYGEN SATURATION: 92 % | WEIGHT: 178.57 LBS | BODY MASS INDEX: 27.97 KG/M2 | HEART RATE: 66 BPM | DIASTOLIC BLOOD PRESSURE: 76 MMHG | SYSTOLIC BLOOD PRESSURE: 127 MMHG

## 2024-05-11 PROCEDURE — 99213 OFFICE O/P EST LOW 20 MIN: CPT

## 2024-05-13 LAB
ALBUMIN SERPL ELPH-MCNC: 4.2 G/DL
ALP BLD-CCNC: 75 U/L
ALT SERPL-CCNC: 30 U/L
ANION GAP SERPL CALC-SCNC: 14 MMOL/L
AST SERPL-CCNC: 38 U/L
BILIRUB SERPL-MCNC: 0.7 MG/DL
BUN SERPL-MCNC: 27 MG/DL
CALCIUM SERPL-MCNC: 10 MG/DL
CHLORIDE SERPL-SCNC: 108 MMOL/L
CHOLEST SERPL-MCNC: 138 MG/DL
CO2 SERPL-SCNC: 21 MMOL/L
CREAT SERPL-MCNC: 1.55 MG/DL
EGFR: 47 ML/MIN/1.73M2
GLUCOSE SERPL-MCNC: 94 MG/DL
HDLC SERPL-MCNC: 45 MG/DL
LDLC SERPL CALC-MCNC: 80 MG/DL
NONHDLC SERPL-MCNC: 94 MG/DL
POTASSIUM SERPL-SCNC: 5 MMOL/L
PROT SERPL-MCNC: 7.3 G/DL
SODIUM SERPL-SCNC: 143 MMOL/L
TRIGL SERPL-MCNC: 69 MG/DL

## 2024-05-14 RX ORDER — ATORVASTATIN CALCIUM 80 MG/1
80 TABLET, FILM COATED ORAL
Qty: 90 | Refills: 1 | Status: ACTIVE | COMMUNITY
Start: 1900-01-01 | End: 1900-01-01

## 2024-05-31 NOTE — HISTORY OF PRESENT ILLNESS
[FreeTextEntry1] :  5/11/24 - Cardiac cath 5/9/24 at [facility] showed 40-50% LM, 70-80% pLAD, 70% mLAD, 99% RCA in-stent restenosis treated with balloon and stent.  Patient has rales at base. FU in one month.   3/28/24 - Patient reports left-sided CP worse with exertion.  He is on ASA 81 mg, Plavix 75 mg, Atorvastatin 40 mg for CAD.  He is on Metoprolol ER 50 mg for HTN.  He is on Imdur 30 mg for CP.  /71 HR 67.  Exam showed rales at bases and trace LLE edema.  ECG showed no ischemic changes.  I advised patient to undergo a cardiac cath for possible atherectomy of the LAD.  He will think about it.  I advised patient to make sure that he is still taking Imdur 30 mg.  10/24/23 - Patient returns for followup.  Patient denies CP.  Patient reports stable SOB.  Patient denies palpitations.  He is on ASA 81 mg, Plavix 75 mg, Atorvastatin 40 mg for CAD.  He is on Metoprolol ER 50 mg for HTN.  He is on Imdur 30 mg for CP.  I advised patient to continue current medications.  FU 6 months.   7/26/23 - Patient is on CPAP at night. He reports unable to breath during the day nap without CPAP while sleeping on his back. He reports SOB ass. w. CP ith mild activities. He is on inhaler.  He would like to find out if his SOB is due to CAD (still has heavily calcified LAD stenosis).   I advised patient to undergo an echocardiogram.  I advised patient to obtain BT for ProBNP (74).  I advised patient to undergo cardiac stress PET to determine the clinical significance of the LAD stenosis.  Patient underwent an echocardiogram and it showed normal LV function, mild aortic root dilatation (4.1 cm), with mild AR.  Cardiac stress PET showed no perfusion defect.  FFR below 0.6  in RCA territory.    4/27/23 - Patient reports REYES.  Patient denies CP.  ECG showed no ischemic changes.  BT showed normal BNP and elevated d-dimer.  Chest CTA showed no PE but with scarring and bronchiectasis.  Patient reports that his SOB is not severe enough to warrant atherectomy of LAD yet.  I advised patient to continue current medications.  FU 3 months.   1/10/23 - Pt still reports exertional SOB. He didn't start Imdur 30 mg because he forgot it. He is on ASA 81 mg, Plavix 75 mg, Atorvastatin 40 mg and Metoprolol ER 50 mg. He is not using NTPatch. He is recently following up w. darshan Nephrologist. Patient has rales at base. He reports that he quit smoking. I advised patient to start on Imdur 30 mg QD.   12/12/22 - Pt is here for recent abnormal chest CT.  It showed severe calcification of coronary artery.  Pt reports increased chest pain comparing to post-stent in April.  Pt reports stable SOB.  I advised patient to start Imdur 30 mg.  I will consider if symptom persists.  Patient reports nocturnal leg cramps.  I advised patient to try MgOxide.  FU 1 month.   8/5//22 - Patient underwent a pharmacologic nuclear stress test and it showed mild basal-mid inferior ischemia. I advised patient to continue current medications. Will consider cardiac cath if CP worse.   5/25/22 - Patient has been stable.  Patient denies CP.  Patient reports SOB with exertion.  Patient denies palpitations.  Patient denies lightheadedness.  Patient denies h/o syncope. I advised patient to continue current medications.  FU 2 months.   5/2/22 - Patient reports decreased CP following stents.  I advised patient to continue ASA 81 mg and Plavix 75 mg.  I advised patient to increase Atorvastatin to 40 mg.  He may stop NTPatch and assess his symptoms.  FU 3 weeks.  4/13/22 - Patient reports CP and SOB with exertion despite medications.  Patient would like to proceed with cardiac cath.  I advised patient to continue current medications.  Cardiac cath 4/27/22 at [facility] with Dr. Arrington showed 70-80% pLAD, 70% mLAD, 95% pRCA, 99% mRCA, LVEDP 22 mmHg. Patient underwent RCA stents x 2. to consider atherectomy of LAD (heavily calcified).   2/14/22 - Patient reports CP and SOB when walking upstairs or walking fast.  He is using NTPatch daily.  His BP and HR were elevated today.  I advised patient to increase Metoprolol ER to 50 mg.  FU 2 months.   1/26/22 - Patient still reports CP and SOB going upstairs.  I advised patient to start NTPatch.  I will consider cardiac cath if his symptom persists.  1/12/22 - Patient reports that for the past 2 years he has been experiencing SSCP radiating to the neck, described as tightness,  worse with exertion, relieved with rest, lasting minutes.  Patient reports REYES.  Patient reports palpitations with exertion.  Patient denies h/o syncope.

## 2024-05-31 NOTE — REASON FOR VISIT
[FreeTextEntry1] : 73 year-old male with CAD (70-80% pLAD, 70% mLAD, 95% pRCA, 99% mRCA) s/p 2 RCA stents at [facility] on 4/27/22, aortic aneurysm (4.6 cm), HTN, HLD, presents for followup.    Patient was last seen on 3/28/24 - Patient reports left-sided CP worse with exertion.  He is on ASA 81 mg, Plavix 75 mg, Atorvastatin 40 mg for CAD.  He is on Metoprolol ER 50 mg for HTN.  He is on Imdur 30 mg for CP.  /71 HR 67.  Exam showed rales at bases and trace LLE edema.  ECG showed no ischemic changes.  I advised patient to undergo a cardiac cath for possible atherectomy of the LAD.  He will think about it.  I advised patient to make sure that he is still taking Imdur 30 mg.  He is on ASA 81 mg, Plavix 75 mg, Atorvastatin 40 mg for CAD.  He is on Metoprolol ER 50 mg for HTN.  He is on Imdur 30 mg for CP.  Cardiac cath 5/9/24 at [facility] showed 40-50% LM, 70-80% pLAD, 70% mLAD, 99% RCA in-stent restenosis treated with balloon and stent.    Patient underwent an echocardiogram 7/26/23 and it showed normal LV function, mild aortic root dilatation (4.1 cm), with mild AR.    Cardiac stress PET 7/27/23 showed no perfusion defect.  FFR below 0.6  in RCA territory.    Patient underwent a pharmacologic nuclear stress test 8/19/22 and it showed mild basal-mid inferior ischemia.   Cardiac cath 4/27/22 at [facility] with Dr. Arrington showed 70-80% pLAD, 70% mLAD, 95% pRCA, 99% mRCA, LVEDP 22 mmHg. Patient underwent RCA stents x 2.  To consider atherectomy of LAD (heavily calcified).   Patient underwent an echocardiogram 1/12/22 and it showed normal LV function with moderate dilatation of the ascending aorta (4.6 cm).    Patient underwent a treadmill stress test 1/12/22 and completed 3 minutes of Sam protocol.  There were upsloping ST depressions on ECG but no symptoms.  Following treadmill stress, there was echocardiographic evidence of basal inferolateral ischemia.

## 2024-05-31 NOTE — CARDIOLOGY SUMMARY
[de-identified] : NSR, no STTw changes.  [de-identified] : Cardiac cath 4/27/22 at [facility] with Dr. Arrington showed 70-80% pLAD, 70% mLAD, 95% pRCA, 99% mRCA, LVEDP 22 mmHg. Patient underwent RCA stents x 2. to consider atherectomy of LAD (heavily calcified).

## 2024-05-31 NOTE — PHYSICAL EXAM
[Well Developed] : well developed [Well Nourished] : well nourished [No Acute Distress] : no acute distress [Normal Conjunctiva] : normal conjunctiva [Normal Venous Pressure] : normal venous pressure [No Carotid Bruit] : no carotid bruit [Normal S1, S2] : normal S1, S2 [No Murmur] : no murmur [No Rub] : no rub [No Gallop] : no gallop [Clear Lung Fields] : clear lung fields [Good Air Entry] : good air entry [No Respiratory Distress] : no respiratory distress  [Soft] : abdomen soft [Non Tender] : non-tender [No Masses/organomegaly] : no masses/organomegaly [Normal Bowel Sounds] : normal bowel sounds [Normal Gait] : normal gait [No Cyanosis] : no cyanosis [No Clubbing] : no clubbing [No Varicosities] : no varicosities [Moves all extremities] : moves all extremities [No Focal Deficits] : no focal deficits [Normal Speech] : normal speech [Alert and Oriented] : alert and oriented [Normal memory] : normal memory [de-identified] : Trace edema noted.

## 2024-06-10 PROBLEM — Z95.820 S/P ANGIOPLASTY WITH STENT: Status: ACTIVE | Noted: 2022-05-03

## 2024-06-10 PROBLEM — I25.10 CAD (CORONARY ARTERY DISEASE), NATIVE CORONARY ARTERY: Status: ACTIVE | Noted: 2022-02-08

## 2024-06-10 PROBLEM — I10 HTN (HYPERTENSION): Status: ACTIVE | Noted: 2022-04-11

## 2024-06-10 PROBLEM — I71.9 AORTIC ANEURYSM: Status: ACTIVE | Noted: 2022-01-12

## 2024-06-11 ENCOUNTER — APPOINTMENT (OUTPATIENT)
Dept: CARDIOLOGY | Facility: CLINIC | Age: 74
End: 2024-06-11
Payer: MEDICARE

## 2024-06-11 VITALS
SYSTOLIC BLOOD PRESSURE: 106 MMHG | BODY MASS INDEX: 28.51 KG/M2 | DIASTOLIC BLOOD PRESSURE: 68 MMHG | OXYGEN SATURATION: 93 % | RESPIRATION RATE: 16 BRPM | HEART RATE: 73 BPM | WEIGHT: 182 LBS

## 2024-06-11 DIAGNOSIS — I25.10 ATHEROSCLEROTIC HEART DISEASE OF NATIVE CORONARY ARTERY W/OUT ANGINA PECTORIS: ICD-10-CM

## 2024-06-11 DIAGNOSIS — Z95.820 PERIPHERAL VASCULAR ANGIOPLASTY STATUS WITH IMPLANTS AND GRAFTS: ICD-10-CM

## 2024-06-11 DIAGNOSIS — I10 ESSENTIAL (PRIMARY) HYPERTENSION: ICD-10-CM

## 2024-06-11 DIAGNOSIS — I71.9 AORTIC ANEURYSM OF UNSPECIFIED SITE, W/OUT RUPTURE: ICD-10-CM

## 2024-06-11 PROCEDURE — 99213 OFFICE O/P EST LOW 20 MIN: CPT

## 2024-06-12 NOTE — HISTORY OF PRESENT ILLNESS
[FreeTextEntry1] : 6/11/24 - Patient reports L sided CP with inspiration and with exertion. Patient reports improved SOB post stent placement.   5/11/24 - Cardiac cath 5/9/24 at [facility] showed 40-50% LM, 70-80% pLAD, 70% mLAD, 99% RCA in-stent restenosis treated with balloon and stent.  Patient has rales at base. FU in one month.   3/28/24 - Patient reports left-sided CP worse with exertion.  He is on ASA 81 mg, Plavix 75 mg, Atorvastatin 40 mg for CAD.  He is on Metoprolol ER 50 mg for HTN.  He is on Imdur 30 mg for CP.  /71 HR 67.  Exam showed rales at bases and trace LLE edema.  ECG showed no ischemic changes.  I advised patient to undergo a cardiac cath for possible atherectomy of the LAD.  He will think about it.  I advised patient to make sure that he is still taking Imdur 30 mg.  10/24/23 - Patient returns for followup.  Patient denies CP.  Patient reports stable SOB.  Patient denies palpitations.  He is on ASA 81 mg, Plavix 75 mg, Atorvastatin 40 mg for CAD.  He is on Metoprolol ER 50 mg for HTN.  He is on Imdur 30 mg for CP.  I advised patient to continue current medications.  FU 6 months.   7/26/23 - Patient is on CPAP at night. He reports unable to breath during the day nap without CPAP while sleeping on his back. He reports SOB ass. w. CP ith mild activities. He is on inhaler.  He would like to find out if his SOB is due to CAD (still has heavily calcified LAD stenosis).   I advised patient to undergo an echocardiogram.  I advised patient to obtain BT for ProBNP (74).  I advised patient to undergo cardiac stress PET to determine the clinical significance of the LAD stenosis.  Patient underwent an echocardiogram and it showed normal LV function, mild aortic root dilatation (4.1 cm), with mild AR.  Cardiac stress PET showed no perfusion defect.  FFR below 0.6  in RCA territory.    4/27/23 - Patient reports REYES.  Patient denies CP.  ECG showed no ischemic changes.  BT showed normal BNP and elevated d-dimer.  Chest CTA showed no PE but with scarring and bronchiectasis.  Patient reports that his SOB is not severe enough to warrant atherectomy of LAD yet.  I advised patient to continue current medications.  FU 3 months.   1/10/23 - Pt still reports exertional SOB. He didn't start Imdur 30 mg because he forgot it. He is on ASA 81 mg, Plavix 75 mg, Atorvastatin 40 mg and Metoprolol ER 50 mg. He is not using NTPatch. He is recently following up w. darshan Nephrologist. Patient has rales at base. He reports that he quit smoking. I advised patient to start on Imdur 30 mg QD.   12/12/22 - Pt is here for recent abnormal chest CT.  It showed severe calcification of coronary artery.  Pt reports increased chest pain comparing to post-stent in April.  Pt reports stable SOB.  I advised patient to start Imdur 30 mg.  I will consider if symptom persists.  Patient reports nocturnal leg cramps.  I advised patient to try MgOxide.  FU 1 month.   8/5//22 - Patient underwent a pharmacologic nuclear stress test and it showed mild basal-mid inferior ischemia. I advised patient to continue current medications. Will consider cardiac cath if CP worse.   5/25/22 - Patient has been stable.  Patient denies CP.  Patient reports SOB with exertion.  Patient denies palpitations.  Patient denies lightheadedness.  Patient denies h/o syncope. I advised patient to continue current medications.  FU 2 months.   5/2/22 - Patient reports decreased CP following stents.  I advised patient to continue ASA 81 mg and Plavix 75 mg.  I advised patient to increase Atorvastatin to 40 mg.  He may stop NTPatch and assess his symptoms.  FU 3 weeks.  4/13/22 - Patient reports CP and SOB with exertion despite medications.  Patient would like to proceed with cardiac cath.  I advised patient to continue current medications.  Cardiac cath 4/27/22 at [facility] with Dr. Arrington showed 70-80% pLAD, 70% mLAD, 95% pRCA, 99% mRCA, LVEDP 22 mmHg. Patient underwent RCA stents x 2. to consider atherectomy of LAD (heavily calcified).   2/14/22 - Patient reports CP and SOB when walking upstairs or walking fast.  He is using NTPatch daily.  His BP and HR were elevated today.  I advised patient to increase Metoprolol ER to 50 mg.  FU 2 months.   1/26/22 - Patient still reports CP and SOB going upstairs.  I advised patient to start NTPatch.  I will consider cardiac cath if his symptom persists.  1/12/22 - Patient reports that for the past 2 years he has been experiencing SSCP radiating to the neck, described as tightness,  worse with exertion, relieved with rest, lasting minutes.  Patient reports REYES.  Patient reports palpitations with exertion.  Patient denies h/o syncope.

## 2024-06-12 NOTE — REASON FOR VISIT
This encounter is now closed.     Appt set on 3/7 at 8:15 AM with Dr Lopez.   [FreeTextEntry1] : 73 year-old male with CAD (70-80% pLAD, 70% mLAD, 95% pRCA, 99% mRCA) s/p 2 RCA stents at [facility] on 4/27/22, aortic aneurysm (4.6 cm), HTN, HLD, presents for followup.    Patient was last seen on 5/11/24 - Cardiac cath 5/9/24 at [facility] showed 40-50% LM, 70-80% pLAD, 70% mLAD, 99% RCA in-stent restenosis treated with balloon and stent.  Patient has rales at base. FU in one month.   He is on ASA 81 mg, Plavix 75 mg, Atorvastatin 40 mg for CAD.  He is on Metoprolol ER 50 mg for HTN.  He is on Imdur 30 mg for CP.  Cardiac cath 5/9/24 at [facility] showed 40-50% LM, 70-80% pLAD, 70% mLAD, 99% RCA in-stent restenosis treated with balloon and stent.    Patient underwent an echocardiogram 7/26/23 and it showed normal LV function, mild aortic root dilatation (4.1 cm), with mild AR.    Cardiac stress PET 7/27/23 showed no perfusion defect.  FFR below 0.6  in RCA territory.    Patient underwent a pharmacologic nuclear stress test 8/19/22 and it showed mild basal-mid inferior ischemia.   Cardiac cath 4/27/22 at [facility] with Dr. Arrington showed 70-80% pLAD, 70% mLAD, 95% pRCA, 99% mRCA, LVEDP 22 mmHg. Patient underwent RCA stents x 2.  To consider atherectomy of LAD (heavily calcified).   Patient underwent an echocardiogram 1/12/22 and it showed normal LV function with moderate dilatation of the ascending aorta (4.6 cm).    Patient underwent a treadmill stress test 1/12/22 and completed 3 minutes of Sam protocol.  There were upsloping ST depressions on ECG but no symptoms.  Following treadmill stress, there was echocardiographic evidence of basal inferolateral ischemia.

## 2024-06-12 NOTE — CARDIOLOGY SUMMARY
[de-identified] : NSR, no STTw changes.  [de-identified] : Cardiac cath 4/27/22 at [facility] with Dr. Arrington showed 70-80% pLAD, 70% mLAD, 95% pRCA, 99% mRCA, LVEDP 22 mmHg. Patient underwent RCA stents x 2. to consider atherectomy of LAD (heavily calcified).

## 2024-06-12 NOTE — PHYSICAL EXAM
[Well Developed] : well developed [Well Nourished] : well nourished [Normal Conjunctiva] : normal conjunctiva [No Acute Distress] : no acute distress [Normal Venous Pressure] : normal venous pressure [No Carotid Bruit] : no carotid bruit [Normal S1, S2] : normal S1, S2 [No Murmur] : no murmur [No Rub] : no rub [No Gallop] : no gallop [Clear Lung Fields] : clear lung fields [Good Air Entry] : good air entry [No Respiratory Distress] : no respiratory distress  [Soft] : abdomen soft [Non Tender] : non-tender [No Masses/organomegaly] : no masses/organomegaly [Normal Bowel Sounds] : normal bowel sounds [Normal Gait] : normal gait [No Cyanosis] : no cyanosis [No Clubbing] : no clubbing [No Varicosities] : no varicosities [Moves all extremities] : moves all extremities [No Focal Deficits] : no focal deficits [Normal Speech] : normal speech [Alert and Oriented] : alert and oriented [Normal memory] : normal memory [de-identified] : Trace edema noted.

## 2024-07-10 ENCOUNTER — APPOINTMENT (OUTPATIENT)
Dept: CARDIOLOGY | Facility: CLINIC | Age: 74
End: 2024-07-10
Payer: MEDICARE

## 2024-07-10 VITALS
RESPIRATION RATE: 16 BRPM | SYSTOLIC BLOOD PRESSURE: 120 MMHG | WEIGHT: 186 LBS | OXYGEN SATURATION: 95 % | HEART RATE: 65 BPM | BODY MASS INDEX: 29.13 KG/M2 | DIASTOLIC BLOOD PRESSURE: 78 MMHG

## 2024-07-10 DIAGNOSIS — Z95.820 PERIPHERAL VASCULAR ANGIOPLASTY STATUS WITH IMPLANTS AND GRAFTS: ICD-10-CM

## 2024-07-10 DIAGNOSIS — I25.10 ATHEROSCLEROTIC HEART DISEASE OF NATIVE CORONARY ARTERY W/OUT ANGINA PECTORIS: ICD-10-CM

## 2024-07-10 DIAGNOSIS — I71.9 AORTIC ANEURYSM OF UNSPECIFIED SITE, W/OUT RUPTURE: ICD-10-CM

## 2024-07-10 DIAGNOSIS — I10 ESSENTIAL (PRIMARY) HYPERTENSION: ICD-10-CM

## 2024-07-10 PROCEDURE — 99213 OFFICE O/P EST LOW 20 MIN: CPT

## 2024-09-27 ENCOUNTER — NON-APPOINTMENT (OUTPATIENT)
Age: 74
End: 2024-09-27

## 2024-09-27 ENCOUNTER — APPOINTMENT (OUTPATIENT)
Dept: CARDIOLOGY | Facility: CLINIC | Age: 74
End: 2024-09-27
Payer: MEDICARE

## 2024-09-27 VITALS
OXYGEN SATURATION: 95 % | WEIGHT: 190 LBS | HEART RATE: 67 BPM | SYSTOLIC BLOOD PRESSURE: 114 MMHG | BODY MASS INDEX: 29.76 KG/M2 | RESPIRATION RATE: 16 BRPM | DIASTOLIC BLOOD PRESSURE: 75 MMHG

## 2024-09-27 DIAGNOSIS — Z95.820 PERIPHERAL VASCULAR ANGIOPLASTY STATUS WITH IMPLANTS AND GRAFTS: ICD-10-CM

## 2024-09-27 DIAGNOSIS — R07.89 OTHER CHEST PAIN: ICD-10-CM

## 2024-09-27 DIAGNOSIS — I25.10 ATHEROSCLEROTIC HEART DISEASE OF NATIVE CORONARY ARTERY W/OUT ANGINA PECTORIS: ICD-10-CM

## 2024-09-27 PROCEDURE — 99214 OFFICE O/P EST MOD 30 MIN: CPT

## 2024-09-27 PROCEDURE — G2211 COMPLEX E/M VISIT ADD ON: CPT

## 2024-09-27 PROCEDURE — 93000 ELECTROCARDIOGRAM COMPLETE: CPT

## 2024-09-27 NOTE — REASON FOR VISIT
[FreeTextEntry1] : 74 year-old male with CAD (70-80% pLAD, 70% mLAD, 95% pRCA, 99% mRCA) s/p 2 RCA stents at [facility] on 4/27/22, in-stent restenosis treated with balloon and stent on 5/9/24 at [facility], aortic aneurysm (4.6 cm), HTN, HLD, presents for followup.  Patient was last seen on 7/10/24- Please refer to NP note below. Pt is here for follow up. Pt reports same exertional SOB. Pt reports improved CP, but still have when in supine position and walking upslope. Patient denies palpitations or lightheadedness. Patient denies h/o syncope. Pt denies any bleeding issues with ASA and Plavix. Pt is on ASA 81 mg, Plavix 75 mg, Atorvastatin 80 mg for CAD. He is on Metoprolol ER 50 mg for HTN. He is on Imdur 30 mg for CP. His home SBP ranged 110-120/70. Today's /78 P 65. Patient has rales at base and trace edema. I advised patient to continue current medications. FU 3 months.  He is on ASA 81 mg, Plavix 75 mg, Atorvastatin 80 mg for CAD. He is on Metoprolol ER 50 mg for HTN. He is on Imdur 30 mg for CP.  Cardiac cath 5/9/24 at [facility] showed 40-50% LM, 70-80% pLAD, 70% mLAD, 99% RCA in-stent restenosis treated with balloon and stent.  Patient underwent an echocardiogram 7/26/23 and it showed normal LV function, mild aortic root dilatation (4.1 cm), with mild AR.  Cardiac stress PET 7/27/23 showed no perfusion defect. FFR below 0.6 in RCA territory.  Patient underwent a pharmacologic nuclear stress test 8/19/22 and it showed mild basal-mid inferior ischemia.  Cardiac cath 4/27/22 at [facility] with Dr. Arrington showed 70-80% pLAD, 70% mLAD, 95% pRCA, 99% mRCA, LVEDP 22 mmHg. Patient underwent RCA stents x 2. To consider atherectomy of LAD (heavily calcified).  Patient underwent an echocardiogram 1/12/22 and it showed normal LV function with moderate dilatation of the ascending aorta (4.6 cm).  Patient underwent a treadmill stress test 1/12/22 and completed 3 minutes of Sam protocol. There were upsloping ST depressions on ECG but no symptoms. Following treadmill stress, there was echocardiographic evidence of basal inferolateral ischemia.

## 2024-09-27 NOTE — HISTORY OF PRESENT ILLNESS
[FreeTextEntry1] : Pt reports worsening left CP for 1 month, feels worse with exertion and relieves with rest. He reports SOB is better after his PCI. Pt denies palpitations or lightheadedness. Pt denies h/o syncope. Pt denies any bleeding issues with ASA. Pt reports B/L LE edema. He still has faint bibasilar crackles and trace LE edema on exam. His home BP ranged 110-125/70-80. Today's /75 P 67. He insists he is taking all medications as prescribed by Dr. Downs.  7/10/24- Please refer to NP note below. Pt is here for follow up. Pt reports same exertional SOB. Pt reports improved CP, but still have when in supine position and walking upslope. Patient denies palpitations or lightheadedness. Patient denies h/o syncope. Pt denies any bleeding issues with ASA and Plavix. His home SBP ranged 110-120/70. Today's /78 P 65. Pt is on ASA 81 mg, Plavix 75 mg, Atorvastatin 80 mg for CAD. He is on Metoprolol ER 50 mg for HTN. He is on Imdur 30 mg for CP.  Patient has rales at base and trace edema.  6/11/24 - Patient reports L sided CP with inspiration and with exertion. Patient reports improved SOB post stent placement.  5/11/24 - Cardiac cath 5/9/24 at [facility] showed 40-50% LM, 70-80% pLAD, 70% mLAD, 99% RCA in-stent restenosis treated with balloon and stent. Patient has rales at base. FU in one month.  3/28/24 - Patient reports left-sided CP worse with exertion. He is on ASA 81 mg, Plavix 75 mg, Atorvastatin 40 mg for CAD. He is on Metoprolol ER 50 mg for HTN. He is on Imdur 30 mg for CP. /71 HR 67. Exam showed rales at bases and trace LLE edema. ECG showed no ischemic changes. I advised patient to undergo a cardiac cath for possible atherectomy of the LAD. He will think about it. I advised patient to make sure that he is still taking Imdur 30 mg.  10/24/23 - Patient returns for followup. Patient denies CP. Patient reports stable SOB. Patient denies palpitations. He is on ASA 81 mg, Plavix 75 mg, Atorvastatin 40 mg for CAD. He is on Metoprolol ER 50 mg for HTN. He is on Imdur 30 mg for CP. I advised patient to continue current medications. FU 6 months.  7/26/23 - Patient is on CPAP at night. He reports unable to breath during the day nap without CPAP while sleeping on his back. He reports SOB ass. w. CP ith mild activities. He is on inhaler. He would like to find out if his SOB is due to CAD (still has heavily calcified LAD stenosis). I advised patient to undergo an echocardiogram. I advised patient to obtain BT for ProBNP (74). I advised patient to undergo cardiac stress PET to determine the clinical significance of the LAD stenosis. Patient underwent an echocardiogram and it showed normal LV function, mild aortic root dilatation (4.1 cm), with mild AR. Cardiac stress PET showed no perfusion defect. FFR below 0.6 in RCA territory.  4/27/23 - Patient reports REYES. Patient denies CP. ECG showed no ischemic changes. BT showed normal BNP and elevated d-dimer. Chest CTA showed no PE but with scarring and bronchiectasis. Patient reports that his SOB is not severe enough to warrant atherectomy of LAD yet. I advised patient to continue current medications. FU 3 months.  1/10/23 - Pt still reports exertional SOB. He didn't start Imdur 30 mg because he forgot it. He is on ASA 81 mg, Plavix 75 mg, Atorvastatin 40 mg and Metoprolol ER 50 mg. He is not using NTPatch. He is recently following up w. a Nephrologist. Patient has rales at base. He reports that he quit smoking. I advised patient to start on Imdur 30 mg QD.  12/12/22 - Pt is here for recent abnormal chest CT. It showed severe calcification of coronary artery. Pt reports increased chest pain comparing to post-stent in April. Pt reports stable SOB. I advised patient to start Imdur 30 mg. I will consider if symptom persists. Patient reports nocturnal leg cramps. I advised patient to try MgOxide. FU 1 month.  8/5//22 - Patient underwent a pharmacologic nuclear stress test and it showed mild basal-mid inferior ischemia. I advised patient to continue current medications. Will consider cardiac cath if CP worse.  5/25/22 - Patient has been stable. Patient denies CP. Patient reports SOB with exertion. Patient denies palpitations. Patient denies lightheadedness. Patient denies h/o syncope. I advised patient to continue current medications. FU 2 months.  5/2/22 - Patient reports decreased CP following stents. I advised patient to continue ASA 81 mg and Plavix 75 mg. I advised patient to increase Atorvastatin to 40 mg. He may stop NTPatch and assess his symptoms. FU 3 weeks.  4/13/22 - Patient reports CP and SOB with exertion despite medications. Patient would like to proceed with cardiac cath. I advised patient to continue current medications. Cardiac cath 4/27/22 at [facility] with Dr. Arrington showed 70-80% pLAD, 70% mLAD, 95% pRCA, 99% mRCA, LVEDP 22 mmHg. Patient underwent RCA stents x 2. to consider atherectomy of LAD (heavily calcified).  2/14/22 - Patient reports CP and SOB when walking upstairs or walking fast. He is using NTPatch daily. His BP and HR were elevated today. I advised patient to increase Metoprolol ER to 50 mg. FU 2 months.  1/26/22 - Patient still reports CP and SOB going upstairs. I advised patient to start NTPatch. I will consider cardiac cath if his symptom persists.  1/12/22 - Patient reports that for the past 2 years he has been experiencing SSCP radiating to the neck, described as tightness, worse with exertion, relieved with rest, lasting minutes. Patient reports REYES. Patient reports palpitations with exertion. Patient denies h/o syncope.

## 2024-09-27 NOTE — CARDIOLOGY SUMMARY
[de-identified] : Cardiac cath 4/27/22 at [facility] with Dr. Arrington showed 70-80% pLAD, 70% mLAD, 95% pRCA, 99% mRCA, LVEDP 22 mmHg. Patient underwent RCA stents x 2. to consider atherectomy of LAD (heavily calcified).

## 2024-09-27 NOTE — CARDIOLOGY SUMMARY
[de-identified] : Cardiac cath 4/27/22 at [facility] with Dr. Arrington showed 70-80% pLAD, 70% mLAD, 95% pRCA, 99% mRCA, LVEDP 22 mmHg. Patient underwent RCA stents x 2. to consider atherectomy of LAD (heavily calcified).

## 2024-09-27 NOTE — PHYSICAL EXAM
[Well Developed] : well developed [Well Nourished] : well nourished [No Acute Distress] : no acute distress [Normal Conjunctiva] : normal conjunctiva [Normal Venous Pressure] : normal venous pressure [No Carotid Bruit] : no carotid bruit [Normal S1, S2] : normal S1, S2 [No Murmur] : no murmur [No Rub] : no rub [No Gallop] : no gallop [Good Air Entry] : good air entry [No Respiratory Distress] : no respiratory distress  [Soft] : abdomen soft [Non Tender] : non-tender [No Masses/organomegaly] : no masses/organomegaly [Normal Bowel Sounds] : normal bowel sounds [Normal Gait] : normal gait [No Cyanosis] : no cyanosis [No Clubbing] : no clubbing [No Varicosities] : no varicosities [No Rash] : no rash [No Skin Lesions] : no skin lesions [Moves all extremities] : moves all extremities [No Focal Deficits] : no focal deficits [Normal Speech] : normal speech [Alert and Oriented] : alert and oriented [Normal memory] : normal memory [de-identified] : Faint bibasilar crackles [de-identified] : Trace LE edema

## 2024-09-27 NOTE — PHYSICAL EXAM
[Well Developed] : well developed [Well Nourished] : well nourished [No Acute Distress] : no acute distress [Normal Conjunctiva] : normal conjunctiva [Normal Venous Pressure] : normal venous pressure [No Carotid Bruit] : no carotid bruit [Normal S1, S2] : normal S1, S2 [No Murmur] : no murmur [No Rub] : no rub [No Gallop] : no gallop [Good Air Entry] : good air entry [No Respiratory Distress] : no respiratory distress  [Soft] : abdomen soft [Non Tender] : non-tender [No Masses/organomegaly] : no masses/organomegaly [Normal Bowel Sounds] : normal bowel sounds [Normal Gait] : normal gait [No Cyanosis] : no cyanosis [No Clubbing] : no clubbing [No Varicosities] : no varicosities [No Rash] : no rash [No Skin Lesions] : no skin lesions [Moves all extremities] : moves all extremities [No Focal Deficits] : no focal deficits [Normal Speech] : normal speech [Alert and Oriented] : alert and oriented [Normal memory] : normal memory [de-identified] : Faint bibasilar crackles [de-identified] : Trace LE edema

## 2024-09-27 NOTE — ASSESSMENT
[FreeTextEntry1] : 74 year-old male with CAD (70-80% pLAD, 70% mLAD, 95% pRCA, 99% mRCA) s/p 2 RCA stents at [facility] on 4/27/22, in-stent restenosis treated with balloon and stent on 5/9/24 at [facility], aortic aneurysm (4.6 cm), HTN, HLD, presents for followup.  1) CAD s/p stent 2) Chest discomfort with exertion - EKG 9/27/24 shows sinus rhythm without ischemic changes - Continue ASA 81, Plavix 75mg daily - Continue Atorvastatin 80mg daily - Continue Metoprolol ER 50mg daily - I advised pt to increase Imdur to 60mg daily for anti-anginal effect. Pt has f/u with Dr. Downs 10/9/24, can consider further testing and/or additional anti-anginals at that time if no symptomatic improvement.  3) Follow-up, October 2024 with Dr. Downs

## 2024-09-27 NOTE — HISTORY OF PRESENT ILLNESS
[FreeTextEntry1] : Pt reports worsening left CP for 1 month, feels worse with exertion and relieves with rest. He reports SOB is better after his PCI. Pt denies palpitations or lightheadedness. Pt denies h/o syncope. Pt denies any bleeding issues with ASA. Pt reports B/L LE edema. He still has faint bibasilar crackles and trace LE edema on exam. His home BP ranged 110-125/70-80. Today's /75 P 67. He insists he is taking all medications as prescribed by Dr. Downs.  7/10/24- Please refer to NP note below. Pt is here for follow up. Pt reports same exertional SOB. Pt reports improved CP, but still have when in supine position and walking upslope. Patient denies palpitations or lightheadedness. Patient denies h/o syncope. Pt denies any bleeding issues with ASA and Plavix. His home SBP ranged 110-120/70. Today's /78 P 65. Pt is on ASA 81 mg, Plavix 75 mg, Atorvastatin 80 mg for CAD. He is on Metoprolol ER 50 mg for HTN. He is on Imdur 30 mg for CP.  Patient has rales at base and trace edema.  6/11/24 - Patient reports L sided CP with inspiration and with exertion. Patient reports improved SOB post stent placement.  5/11/24 - Cardiac cath 5/9/24 at [facility] showed 40-50% LM, 70-80% pLAD, 70% mLAD, 99% RCA in-stent restenosis treated with balloon and stent. Patient has rales at base. FU in one month.  3/28/24 - Patient reports left-sided CP worse with exertion. He is on ASA 81 mg, Plavix 75 mg, Atorvastatin 40 mg for CAD. He is on Metoprolol ER 50 mg for HTN. He is on Imdur 30 mg for CP. /71 HR 67. Exam showed rales at bases and trace LLE edema. ECG showed no ischemic changes. I advised patient to undergo a cardiac cath for possible atherectomy of the LAD. He will think about it. I advised patient to make sure that he is still taking Imdur 30 mg.  10/24/23 - Patient returns for followup. Patient denies CP. Patient reports stable SOB. Patient denies palpitations. He is on ASA 81 mg, Plavix 75 mg, Atorvastatin 40 mg for CAD. He is on Metoprolol ER 50 mg for HTN. He is on Imdur 30 mg for CP. I advised patient to continue current medications. FU 6 months.  7/26/23 - Patient is on CPAP at night. He reports unable to breath during the day nap without CPAP while sleeping on his back. He reports SOB ass. w. CP ith mild activities. He is on inhaler. He would like to find out if his SOB is due to CAD (still has heavily calcified LAD stenosis). I advised patient to undergo an echocardiogram. I advised patient to obtain BT for ProBNP (74). I advised patient to undergo cardiac stress PET to determine the clinical significance of the LAD stenosis. Patient underwent an echocardiogram and it showed normal LV function, mild aortic root dilatation (4.1 cm), with mild AR. Cardiac stress PET showed no perfusion defect. FFR below 0.6 in RCA territory.  4/27/23 - Patient reports REYES. Patient denies CP. ECG showed no ischemic changes. BT showed normal BNP and elevated d-dimer. Chest CTA showed no PE but with scarring and bronchiectasis. Patient reports that his SOB is not severe enough to warrant atherectomy of LAD yet. I advised patient to continue current medications. FU 3 months.  1/10/23 - Pt still reports exertional SOB. He didn't start Imdur 30 mg because he forgot it. He is on ASA 81 mg, Plavix 75 mg, Atorvastatin 40 mg and Metoprolol ER 50 mg. He is not using NTPatch. He is recently following up w. a Nephrologist. Patient has rales at base. He reports that he quit smoking. I advised patient to start on Imdur 30 mg QD.  12/12/22 - Pt is here for recent abnormal chest CT. It showed severe calcification of coronary artery. Pt reports increased chest pain comparing to post-stent in April. Pt reports stable SOB. I advised patient to start Imdur 30 mg. I will consider if symptom persists. Patient reports nocturnal leg cramps. I advised patient to try MgOxide. FU 1 month.  8/5//22 - Patient underwent a pharmacologic nuclear stress test and it showed mild basal-mid inferior ischemia. I advised patient to continue current medications. Will consider cardiac cath if CP worse.  5/25/22 - Patient has been stable. Patient denies CP. Patient reports SOB with exertion. Patient denies palpitations. Patient denies lightheadedness. Patient denies h/o syncope. I advised patient to continue current medications. FU 2 months.  5/2/22 - Patient reports decreased CP following stents. I advised patient to continue ASA 81 mg and Plavix 75 mg. I advised patient to increase Atorvastatin to 40 mg. He may stop NTPatch and assess his symptoms. FU 3 weeks.  4/13/22 - Patient reports CP and SOB with exertion despite medications. Patient would like to proceed with cardiac cath. I advised patient to continue current medications. Cardiac cath 4/27/22 at [facility] with Dr. Arrington showed 70-80% pLAD, 70% mLAD, 95% pRCA, 99% mRCA, LVEDP 22 mmHg. Patient underwent RCA stents x 2. to consider atherectomy of LAD (heavily calcified).  2/14/22 - Patient reports CP and SOB when walking upstairs or walking fast. He is using NTPatch daily. His BP and HR were elevated today. I advised patient to increase Metoprolol ER to 50 mg. FU 2 months.  1/26/22 - Patient still reports CP and SOB going upstairs. I advised patient to start NTPatch. I will consider cardiac cath if his symptom persists.  1/12/22 - Patient reports that for the past 2 years he has been experiencing SSCP radiating to the neck, described as tightness, worse with exertion, relieved with rest, lasting minutes. Patient reports REYES. Patient reports palpitations with exertion. Patient denies h/o syncope.

## 2024-09-27 NOTE — ASSESSMENT
[FreeTextEntry1] : 74 year-old male with CAD (70-80% pLAD, 70% mLAD, 95% pRCA, 99% mRCA) s/p 2 RCA stents at [facility] on 4/27/22, in-stent restenosis treated with balloon and stent on 5/9/24 at [facility], aortic aneurysm (4.6 cm), HTN, HLD, presents for followup.  1) CAD s/p stent 2) Chest discomfort with exertion - EKG 9/27/24 shows sinus rhythm without ischemic changes - Continue ASA 81, Plavix 75mg daily - Continue Atorvastatin 80mg daily - Continue Metoprolol ER 50mg daily - I advised pt to increase Imdur to 60mg daily for anti-anginal effect. Pt has f/u with Dr. Downs 10/9/24, can consider further testing and/or additional anti-anginals at that time if no symptomatic improvement.  3) Follow-up, October 2024 with Dr. Downs

## 2024-09-27 NOTE — REASON FOR VISIT
[FreeTextEntry1] : 74 year-old male with CAD (70-80% pLAD, 70% mLAD, 95% pRCA, 99% mRCA) s/p 2 RCA stents at [facility] on 4/27/22, in-stent restenosis treated with balloon and stent on 5/9/24 at [facility], aortic aneurysm (4.6 cm), HTN, HLD, presents for followup.  Patient was last seen on 7/10/24- Please refer to NP note below. Pt is here for follow up. Pt reports same exertional SOB. Pt reports improved CP, but still have when in supine position and walking upslope. Patient denies palpitations or lightheadedness. Patient denies h/o syncope. Pt denies any bleeding issues with ASA and Plavix. Pt is on ASA 81 mg, Plavix 75 mg, Atorvastatin 80 mg for CAD. He is on Metoprolol ER 50 mg for HTN. He is on Imdur 30 mg for CP. His home SBP ranged 110-120/70. Today's /78 P 65. Patient has rales at base and trace edema. I advised patient to continue current medications. FU 3 months.  He is on ASA 81 mg, Plavix 75 mg, Atorvastatin 80 mg for CAD. He is on Metoprolol ER 50 mg for HTN. He is on Imdur 30 mg for CP.  Cardiac cath 5/9/24 at [facility] showed 40-50% LM, 70-80% pLAD, 70% mLAD, 99% RCA in-stent restenosis treated with balloon and stent.  Patient underwent an echocardiogram 7/26/23 and it showed normal LV function, mild aortic root dilatation (4.1 cm), with mild AR.  Cardiac stress PET 7/27/23 showed no perfusion defect. FFR below 0.6 in RCA territory.  Patient underwent a pharmacologic nuclear stress test 8/19/22 and it showed mild basal-mid inferior ischemia.  Cardiac cath 4/27/22 at [facility] with Dr. Arrington showed 70-80% pLAD, 70% mLAD, 95% pRCA, 99% mRCA, LVEDP 22 mmHg. Patient underwent RCA stents x 2. To consider atherectomy of LAD (heavily calcified).  Patient underwent an echocardiogram 1/12/22 and it showed normal LV function with moderate dilatation of the ascending aorta (4.6 cm).  Patient underwent a treadmill stress test 1/12/22 and completed 3 minutes of Sam protocol. There were upsloping ST depressions on ECG but no symptoms. Following treadmill stress, there was echocardiographic evidence of basal inferolateral ischemia.

## 2024-10-09 ENCOUNTER — APPOINTMENT (OUTPATIENT)
Dept: CARDIOLOGY | Facility: CLINIC | Age: 74
End: 2024-10-09
Payer: MEDICARE

## 2024-10-09 VITALS
SYSTOLIC BLOOD PRESSURE: 123 MMHG | RESPIRATION RATE: 18 BRPM | BODY MASS INDEX: 29.76 KG/M2 | WEIGHT: 190 LBS | DIASTOLIC BLOOD PRESSURE: 68 MMHG | OXYGEN SATURATION: 93 % | HEART RATE: 64 BPM

## 2024-10-09 DIAGNOSIS — I10 ESSENTIAL (PRIMARY) HYPERTENSION: ICD-10-CM

## 2024-10-09 DIAGNOSIS — I25.10 ATHEROSCLEROTIC HEART DISEASE OF NATIVE CORONARY ARTERY W/OUT ANGINA PECTORIS: ICD-10-CM

## 2024-10-09 DIAGNOSIS — Z95.820 PERIPHERAL VASCULAR ANGIOPLASTY STATUS WITH IMPLANTS AND GRAFTS: ICD-10-CM

## 2024-10-09 DIAGNOSIS — R06.02 SHORTNESS OF BREATH: ICD-10-CM

## 2024-10-09 DIAGNOSIS — R07.89 OTHER CHEST PAIN: ICD-10-CM

## 2024-10-09 DIAGNOSIS — I71.9 AORTIC ANEURYSM OF UNSPECIFIED SITE, W/OUT RUPTURE: ICD-10-CM

## 2024-10-09 PROCEDURE — 99214 OFFICE O/P EST MOD 30 MIN: CPT

## 2024-10-09 RX ORDER — RANOLAZINE 500 MG/1
500 TABLET, EXTENDED RELEASE ORAL
Qty: 60 | Refills: 5 | Status: ACTIVE | COMMUNITY
Start: 2024-10-09 | End: 1900-01-01

## 2024-10-10 LAB
ALBUMIN SERPL ELPH-MCNC: 3.8 G/DL
ALP BLD-CCNC: 101 U/L
ALT SERPL-CCNC: 42 U/L
ANION GAP SERPL CALC-SCNC: 12 MMOL/L
AST SERPL-CCNC: 39 U/L
BILIRUB SERPL-MCNC: 0.8 MG/DL
BUN SERPL-MCNC: 19 MG/DL
CALCIUM SERPL-MCNC: 9.1 MG/DL
CHLORIDE SERPL-SCNC: 108 MMOL/L
CO2 SERPL-SCNC: 22 MMOL/L
CREAT SERPL-MCNC: 1.26 MG/DL
EGFR: 60 ML/MIN/1.73M2
GLUCOSE SERPL-MCNC: 137 MG/DL
NT-PROBNP SERPL-MCNC: 135 PG/ML
POTASSIUM SERPL-SCNC: 4.5 MMOL/L
PROT SERPL-MCNC: 6.7 G/DL
SODIUM SERPL-SCNC: 142 MMOL/L
TROPONIN I SERPL-MCNC: <0.01 NG/ML

## 2024-10-22 ENCOUNTER — APPOINTMENT (OUTPATIENT)
Dept: CARDIOLOGY | Facility: CLINIC | Age: 74
End: 2024-10-22

## 2024-10-22 PROCEDURE — A9500: CPT | Mod: JZ

## 2024-10-22 PROCEDURE — 78452 HT MUSCLE IMAGE SPECT MULT: CPT

## 2024-10-22 PROCEDURE — 93015 CV STRESS TEST SUPVJ I&R: CPT

## 2024-11-13 ENCOUNTER — APPOINTMENT (OUTPATIENT)
Dept: CARDIOLOGY | Facility: CLINIC | Age: 74
End: 2024-11-13
Payer: MEDICARE

## 2024-11-13 DIAGNOSIS — R07.89 OTHER CHEST PAIN: ICD-10-CM

## 2024-11-13 DIAGNOSIS — I10 ESSENTIAL (PRIMARY) HYPERTENSION: ICD-10-CM

## 2024-11-13 DIAGNOSIS — R06.02 SHORTNESS OF BREATH: ICD-10-CM

## 2024-11-13 DIAGNOSIS — Z95.820 PERIPHERAL VASCULAR ANGIOPLASTY STATUS WITH IMPLANTS AND GRAFTS: ICD-10-CM

## 2024-11-13 DIAGNOSIS — I71.9 AORTIC ANEURYSM OF UNSPECIFIED SITE, W/OUT RUPTURE: ICD-10-CM

## 2024-11-13 DIAGNOSIS — I25.10 ATHEROSCLEROTIC HEART DISEASE OF NATIVE CORONARY ARTERY W/OUT ANGINA PECTORIS: ICD-10-CM

## 2024-11-13 DIAGNOSIS — R09.89 OTHER SPECIFIED SYMPTOMS AND SIGNS INVOLVING THE CIRCULATORY AND RESPIRATORY SYSTEMS: ICD-10-CM

## 2024-11-13 PROCEDURE — G2211 COMPLEX E/M VISIT ADD ON: CPT

## 2024-11-13 PROCEDURE — 99215 OFFICE O/P EST HI 40 MIN: CPT

## 2024-11-27 ENCOUNTER — APPOINTMENT (OUTPATIENT)
Dept: CARDIOLOGY | Facility: CLINIC | Age: 74
End: 2024-11-27
Payer: MEDICARE

## 2024-11-27 DIAGNOSIS — R09.89 OTHER SPECIFIED SYMPTOMS AND SIGNS INVOLVING THE CIRCULATORY AND RESPIRATORY SYSTEMS: ICD-10-CM

## 2024-11-27 PROCEDURE — 93922 UPR/L XTREMITY ART 2 LEVELS: CPT

## 2024-12-09 ENCOUNTER — APPOINTMENT (OUTPATIENT)
Dept: CARDIOLOGY | Facility: CLINIC | Age: 74
End: 2024-12-09
Payer: MEDICARE

## 2024-12-09 VITALS
WEIGHT: 188 LBS | SYSTOLIC BLOOD PRESSURE: 121 MMHG | BODY MASS INDEX: 29.45 KG/M2 | OXYGEN SATURATION: 94 % | RESPIRATION RATE: 18 BRPM | HEART RATE: 67 BPM | DIASTOLIC BLOOD PRESSURE: 69 MMHG

## 2024-12-09 DIAGNOSIS — I71.9 AORTIC ANEURYSM OF UNSPECIFIED SITE, W/OUT RUPTURE: ICD-10-CM

## 2024-12-09 DIAGNOSIS — Z95.820 PERIPHERAL VASCULAR ANGIOPLASTY STATUS WITH IMPLANTS AND GRAFTS: ICD-10-CM

## 2024-12-09 DIAGNOSIS — I25.10 ATHEROSCLEROTIC HEART DISEASE OF NATIVE CORONARY ARTERY W/OUT ANGINA PECTORIS: ICD-10-CM

## 2024-12-09 DIAGNOSIS — I10 ESSENTIAL (PRIMARY) HYPERTENSION: ICD-10-CM

## 2024-12-09 PROCEDURE — 99213 OFFICE O/P EST LOW 20 MIN: CPT

## 2025-01-13 ENCOUNTER — APPOINTMENT (OUTPATIENT)
Dept: CARDIOLOGY | Facility: CLINIC | Age: 75
End: 2025-01-13
Payer: MEDICARE

## 2025-01-13 VITALS
BODY MASS INDEX: 28.35 KG/M2 | OXYGEN SATURATION: 97 % | HEART RATE: 82 BPM | WEIGHT: 181 LBS | DIASTOLIC BLOOD PRESSURE: 62 MMHG | SYSTOLIC BLOOD PRESSURE: 99 MMHG | RESPIRATION RATE: 16 BRPM

## 2025-01-13 DIAGNOSIS — Z95.820 PERIPHERAL VASCULAR ANGIOPLASTY STATUS WITH IMPLANTS AND GRAFTS: ICD-10-CM

## 2025-01-13 DIAGNOSIS — E78.5 HYPERLIPIDEMIA, UNSPECIFIED: ICD-10-CM

## 2025-01-13 DIAGNOSIS — I10 ESSENTIAL (PRIMARY) HYPERTENSION: ICD-10-CM

## 2025-01-13 DIAGNOSIS — I25.10 ATHEROSCLEROTIC HEART DISEASE OF NATIVE CORONARY ARTERY W/OUT ANGINA PECTORIS: ICD-10-CM

## 2025-01-13 PROCEDURE — 99214 OFFICE O/P EST MOD 30 MIN: CPT

## 2025-03-27 ENCOUNTER — APPOINTMENT (OUTPATIENT)
Dept: THORACIC SURGERY | Facility: CLINIC | Age: 75
End: 2025-03-27
Payer: MEDICARE

## 2025-03-27 ENCOUNTER — NON-APPOINTMENT (OUTPATIENT)
Age: 75
End: 2025-03-27

## 2025-03-27 VITALS
WEIGHT: 185 LBS | HEART RATE: 69 BPM | SYSTOLIC BLOOD PRESSURE: 153 MMHG | BODY MASS INDEX: 28.98 KG/M2 | DIASTOLIC BLOOD PRESSURE: 78 MMHG | RESPIRATION RATE: 18 BRPM | OXYGEN SATURATION: 91 %

## 2025-03-27 DIAGNOSIS — Z87.891 PERSONAL HISTORY OF NICOTINE DEPENDENCE: ICD-10-CM

## 2025-03-27 DIAGNOSIS — C34.92 MALIGNANT NEOPLASM OF UNSPECIFIED PART OF LEFT BRONCHUS OR LUNG: ICD-10-CM

## 2025-03-27 PROCEDURE — 99205 OFFICE O/P NEW HI 60 MIN: CPT

## 2025-03-28 PROBLEM — C34.92 ADENOCARCINOMA OF LEFT LUNG: Status: ACTIVE | Noted: 2025-03-28

## 2025-04-03 ENCOUNTER — APPOINTMENT (OUTPATIENT)
Dept: CARDIOLOGY | Facility: CLINIC | Age: 75
End: 2025-04-03
Payer: MEDICARE

## 2025-04-03 ENCOUNTER — NON-APPOINTMENT (OUTPATIENT)
Age: 75
End: 2025-04-03

## 2025-04-03 VITALS
WEIGHT: 187 LBS | RESPIRATION RATE: 18 BRPM | SYSTOLIC BLOOD PRESSURE: 118 MMHG | OXYGEN SATURATION: 91 % | DIASTOLIC BLOOD PRESSURE: 72 MMHG | BODY MASS INDEX: 29.29 KG/M2 | HEART RATE: 66 BPM

## 2025-04-03 DIAGNOSIS — Z01.810 ENCOUNTER FOR PREPROCEDURAL CARDIOVASCULAR EXAMINATION: ICD-10-CM

## 2025-04-03 DIAGNOSIS — I25.10 ATHEROSCLEROTIC HEART DISEASE OF NATIVE CORONARY ARTERY W/OUT ANGINA PECTORIS: ICD-10-CM

## 2025-04-03 DIAGNOSIS — I71.9 AORTIC ANEURYSM OF UNSPECIFIED SITE, W/OUT RUPTURE: ICD-10-CM

## 2025-04-03 DIAGNOSIS — I10 ESSENTIAL (PRIMARY) HYPERTENSION: ICD-10-CM

## 2025-04-03 DIAGNOSIS — Z95.820 PERIPHERAL VASCULAR ANGIOPLASTY STATUS WITH IMPLANTS AND GRAFTS: ICD-10-CM

## 2025-04-03 DIAGNOSIS — R07.89 OTHER CHEST PAIN: ICD-10-CM

## 2025-04-03 PROCEDURE — 93000 ELECTROCARDIOGRAM COMPLETE: CPT

## 2025-04-03 PROCEDURE — 99214 OFFICE O/P EST MOD 30 MIN: CPT | Mod: 25

## 2025-04-04 PROBLEM — Z01.810 PREOP CARDIOVASCULAR EXAM: Status: ACTIVE | Noted: 2025-04-04

## 2025-04-07 ENCOUNTER — RESULT REVIEW (OUTPATIENT)
Age: 75
End: 2025-04-07

## 2025-04-07 ENCOUNTER — OUTPATIENT (OUTPATIENT)
Dept: OUTPATIENT SERVICES | Facility: HOSPITAL | Age: 75
LOS: 1 days | End: 2025-04-07

## 2025-04-07 VITALS
HEIGHT: 66 IN | SYSTOLIC BLOOD PRESSURE: 145 MMHG | TEMPERATURE: 99 F | DIASTOLIC BLOOD PRESSURE: 80 MMHG | RESPIRATION RATE: 16 BRPM | WEIGHT: 184.97 LBS | OXYGEN SATURATION: 98 % | HEART RATE: 66 BPM

## 2025-04-07 DIAGNOSIS — Z95.5 PRESENCE OF CORONARY ANGIOPLASTY IMPLANT AND GRAFT: Chronic | ICD-10-CM

## 2025-04-07 DIAGNOSIS — G47.33 OBSTRUCTIVE SLEEP APNEA (ADULT) (PEDIATRIC): ICD-10-CM

## 2025-04-07 DIAGNOSIS — Z98.49 CATARACT EXTRACTION STATUS, UNSPECIFIED EYE: Chronic | ICD-10-CM

## 2025-04-07 DIAGNOSIS — C34.90 MALIGNANT NEOPLASM OF UNSPECIFIED PART OF UNSPECIFIED BRONCHUS OR LUNG: ICD-10-CM

## 2025-04-07 DIAGNOSIS — C34.92 MALIGNANT NEOPLASM OF UNSPECIFIED PART OF LEFT BRONCHUS OR LUNG: ICD-10-CM

## 2025-04-07 DIAGNOSIS — I10 ESSENTIAL (PRIMARY) HYPERTENSION: ICD-10-CM

## 2025-04-07 DIAGNOSIS — Z98.890 OTHER SPECIFIED POSTPROCEDURAL STATES: Chronic | ICD-10-CM

## 2025-04-07 DIAGNOSIS — I25.10 ATHEROSCLEROTIC HEART DISEASE OF NATIVE CORONARY ARTERY WITHOUT ANGINA PECTORIS: ICD-10-CM

## 2025-04-07 LAB
ANION GAP SERPL CALC-SCNC: 11 MMOL/L — SIGNIFICANT CHANGE UP (ref 7–14)
BASOPHILS # BLD AUTO: 0.06 K/UL — SIGNIFICANT CHANGE UP (ref 0–0.2)
BASOPHILS NFR BLD AUTO: 1.2 % — SIGNIFICANT CHANGE UP (ref 0–2)
BLD GP AB SCN SERPL QL: NEGATIVE — SIGNIFICANT CHANGE UP
BUN SERPL-MCNC: 16 MG/DL — SIGNIFICANT CHANGE UP (ref 7–23)
CALCIUM SERPL-MCNC: 9.8 MG/DL — SIGNIFICANT CHANGE UP (ref 8.4–10.5)
CHLORIDE SERPL-SCNC: 107 MMOL/L — SIGNIFICANT CHANGE UP (ref 98–107)
CO2 SERPL-SCNC: 24 MMOL/L — SIGNIFICANT CHANGE UP (ref 22–31)
CREAT SERPL-MCNC: 1.37 MG/DL — HIGH (ref 0.5–1.3)
EGFR: 54 ML/MIN/1.73M2 — LOW
EGFR: 54 ML/MIN/1.73M2 — LOW
EOSINOPHIL # BLD AUTO: 0.24 K/UL — SIGNIFICANT CHANGE UP (ref 0–0.5)
EOSINOPHIL NFR BLD AUTO: 4.8 % — SIGNIFICANT CHANGE UP (ref 0–6)
FOLATE SERPL-MCNC: >20 NG/ML — SIGNIFICANT CHANGE UP
GLUCOSE SERPL-MCNC: 114 MG/DL — HIGH (ref 70–99)
HCT VFR BLD CALC: 37.1 % — LOW (ref 39–50)
HGB BLD-MCNC: 12.7 G/DL — LOW (ref 13–17)
IMM GRANULOCYTES NFR BLD AUTO: 0.2 % — SIGNIFICANT CHANGE UP (ref 0–0.9)
LYMPHOCYTES # BLD AUTO: 1.44 K/UL — SIGNIFICANT CHANGE UP (ref 1–3.3)
LYMPHOCYTES # BLD AUTO: 28.9 % — SIGNIFICANT CHANGE UP (ref 13–44)
MCHC RBC-ENTMCNC: 34.2 G/DL — SIGNIFICANT CHANGE UP (ref 32–36)
MCHC RBC-ENTMCNC: 35.7 PG — HIGH (ref 27–34)
MCV RBC AUTO: 104.2 FL — HIGH (ref 80–100)
MONOCYTES # BLD AUTO: 0.52 K/UL — SIGNIFICANT CHANGE UP (ref 0–0.9)
MONOCYTES NFR BLD AUTO: 10.4 % — SIGNIFICANT CHANGE UP (ref 2–14)
NEUTROPHILS # BLD AUTO: 2.72 K/UL — SIGNIFICANT CHANGE UP (ref 1.8–7.4)
NEUTROPHILS NFR BLD AUTO: 54.5 % — SIGNIFICANT CHANGE UP (ref 43–77)
PLATELET # BLD AUTO: 143 K/UL — LOW (ref 150–400)
POTASSIUM SERPL-MCNC: 4.3 MMOL/L — SIGNIFICANT CHANGE UP (ref 3.5–5.3)
POTASSIUM SERPL-SCNC: 4.3 MMOL/L — SIGNIFICANT CHANGE UP (ref 3.5–5.3)
RBC # BLD: 3.56 M/UL — LOW (ref 4.2–5.8)
RBC # FLD: 13.1 % — SIGNIFICANT CHANGE UP (ref 10.3–14.5)
RETICS #: 59.9 K/UL — SIGNIFICANT CHANGE UP (ref 25–125)
RETICS/RBC NFR: 1.7 % — SIGNIFICANT CHANGE UP (ref 0.5–2.5)
RH IG SCN BLD-IMP: POSITIVE — SIGNIFICANT CHANGE UP
RH IG SCN BLD-IMP: POSITIVE — SIGNIFICANT CHANGE UP
SODIUM SERPL-SCNC: 142 MMOL/L — SIGNIFICANT CHANGE UP (ref 135–145)
VIT B12 SERPL-MCNC: >2000 PG/ML — HIGH (ref 232–1245)
WBC # BLD: 4.99 K/UL — SIGNIFICANT CHANGE UP (ref 3.8–10.5)
WBC # FLD AUTO: 4.99 K/UL — SIGNIFICANT CHANGE UP (ref 3.8–10.5)

## 2025-04-07 NOTE — H&P PST ADULT - PROBLEM SELECTOR PLAN 1
Patient tentatively scheduled for left video assisted thoracoscopy robotic assisted wedge resection of left lower lobe mediastinal lymph node dissection     Pre-op instructions provided.  Famotidine provided with instructions. Hibiclens provided with instructions and was signed by patient. Teach-back method was utilized to assess patient's understanding. Patient verbalized understanding.    ordered CBC, BMP, Type an ABO

## 2025-04-07 NOTE — H&P PST ADULT - GENITOURINARY
Patient Education   Personalized Prevention Plan  You are due for the preventive services outlined below.  Your care team is available to assist you in scheduling these services.  If you have already completed any of these items, please share that information with your care team to update in your medical record.  Health Maintenance Due   Topic Date Due     URINE DRUG SCREEN  Never done     Colorectal Cancer Screening  Never done     Zoster (Shingles) Vaccine (1 of 2) Never done     Osteoporosis Screening  08/21/2015     Discuss Advance Care Planning  08/28/2017     Pneumococcal Vaccine (1 of 1 - PPSV23) Never done     FALL RISK ASSESSMENT  07/15/2021       Exercise for a Healthier Heart  You may wonder how you can improve the health of your heart. If you re thinking about exercise, you re on the right track. You don t need to become an athlete. But you do need a certain amount of brisk exercise to help strengthen your heart. If you have been diagnosed with a heart condition, your healthcare provider may advise exercise to help stabilize your condition. To help make exercise a habit, choose safe, fun activities.      Exercise with a friend. When activity is fun, you're more likely to stick with it.   Before you start  Check with your healthcare provider before starting an exercise program. This is especially important if you have not been active for a while. It's also important if you have a long-term (chronic) health problem such as heart disease, diabetes, or obesity. Or if you are at high risk for having these problems.   Why exercise?  Exercising regularly offers many healthy rewards. It can help you do all of the following:     Improve your blood cholesterol level to help prevent further heart trouble    Lower your blood pressure to help prevent a stroke or heart attack    Control diabetes, or reduce your risk of getting this disease    Improve your heart and lung function    Reach and stay at a healthy  weight    Make your muscles stronger so you can stay active    Prevent falls and fractures by slowing the loss of bone mass (osteoporosis)    Manage stress better    Reduce your blood pressure    Improve your sense of self and your body image  Exercise tips      Ease into your routine. Set small goals. Then build on them. If you are not sure what your activity level should be, talk with your healthcare provider first before starting an exercise routine.    Exercise on most days. Aim for a total of 150 minutes (2 hours and 30 minutes) or more of moderate-intensity aerobic activity each week. Or 75 minutes (1 hour and 15 minutes) or more of vigorous-intensity aerobic activity each week. Or try for a combination of both. Moderate activity means that you breathe heavier and your heart rate increases but you can still talk. Think about doing 40 minutes of moderate exercise, 3 to 4 times a week. For best results, activity should last for about 40 minutes to lower blood pressure and cholesterol. It's OK to work up to the 40-minute period over time. Examples of moderate-intensity activity are walking 1 mile in 15 minutes. Or doing 30 to 45 minutes of yard work.    Step up your daily activity level.  Along with your exercise program, try being more active the whole day. Walk instead of drive. Or park further away so that you take more steps each day. Do more household tasks or yard work. You may not be able to meet the advised mount of physical activity. But doing some moderate- or vigorous-intensity aerobic activity can help reduce your risk for heart disease. Your healthcare provider can help you figure out what is best for you.    Choose 1 or more activities you enjoy.  Walking is one of the easiest things you can do. You can also try swimming, riding a bike, dancing, or taking an exercise class.    When to call your healthcare provider  Call your healthcare provider if you have any of these:     Chest pain or feel dizzy  or lightheaded    Burning, tightness, pressure, or heaviness in your chest, neck, shoulders, back, or arms    Abnormal shortness of breath    More joint or muscle pain    A very fast or irregular heartbeat (palpitations)  [employer] last reviewed this educational content on 7/1/2019 2000-2021 [employer]. All rights reserved. This information is not intended as a substitute for professional medical care. Always follow your healthcare professional's instructions.          Understanding USDA MyPlate  The USDA has guidelines to help you make healthy food choices. These are called MyPlate. MyPlate shows the food groups that make up healthy meals using the image of a place setting. Before you eat, think about the healthiest choices for what to put on your plate or in your cup or bowl. To learn more about building a healthy plate, visit www.choosemyplate.gov.    The food groups    Fruits. Any fruit or 100% fruit juice counts as part of the Fruit Group. Fruits may be fresh, canned, frozen, or dried, and may be whole, cut-up, or pureed. Make 1/2 of your plate fruits and vegetables.    Vegetables. Any vegetable or 100% vegetable juice counts as a member of the Vegetable Group. Vegetables may be fresh, frozen, canned, or dried. They can be served raw or cooked and may be whole, cut-up, or mashed. Make 1/2 of your plate fruits and vegetables.    Grains. All foods made from grains are part of the Grains Group. These include wheat, rice, oats, cornmeal, and barley. Grains are often used to make foods such as bread, pasta, oatmeal, cereal, tortillas, and grits. Grains should be no more than 1/4 of your plate. At least half of your grains should be whole grains.    Protein. This group includes meat, poultry, seafood, beans and peas, eggs, processed soy products (such as tofu), nuts (including nut butters), and seeds. Make protein choices no more than 1/4 of your plate. Meat and poultry choices should be lean or low  fat.    Dairy. The Dairy Group includes all fluid milk products and foods made from milk that contain calcium, such as yogurt and cheese. (Foods that have little calcium, such as cream, butter, and cream cheese, are not part of this group.) Most dairy choices should be low-fat or fat-free.    Oils. Oils aren't a food group, but they do contain essential nutrients. However it's important to watch your intake of oils. These are fats that are liquid at room temperature. They include canola, corn, olive, soybean, vegetable, and sunflower oil. Foods that are mainly oil include mayonnaise, certain salad dressings, and soft margarines. You likely already get your daily oil allowance from the foods you eat.  Things to limit  Eating healthy also means limiting these things in your diet:       Salt (sodium). Many processed foods have a lot of sodium. To keep sodium intake down, eat fresh vegetables, meats, poultry, and seafood when possible. Purchase low-sodium, reduced-sodium, or no-salt-added food products at the store. And don't add salt to your meals at home. Instead, season them with herbs and spices such as dill, oregano, cumin, and paprika. Or try adding flavor with lemon or lime zest and juice.    Saturated fat. Saturated fats are most often found in animal products such as beef, pork, and chicken. They are often solid at room temperature, such as butter. To reduce your saturated fat intake, choose leaner cuts of meat and poultry. And try healthier cooking methods such as grilling, broiling, roasting, or baking. For a simple lower-fat swap, use plain nonfat yogurt instead of mayonnaise when making potato salad or macaroni salad.    Added sugars. These are sugars added to foods. They are in foods such as ice cream, candy, soda, fruit drinks, sports drinks, energy drinks, cookies, pastries, jams, and syrups. Cut down on added sugars by sharing sweet treats with a family member or friend. You can also choose fruit for  dessert, and drink water or other unsweetened beverages.     [employer] last reviewed this educational content on 6/1/2020 2000-2021 [employer]. All rights reserved. This information is not intended as a substitute for professional medical care. Always follow your healthcare professional's instructions.            details…

## 2025-04-07 NOTE — H&P PST ADULT - CARDIOVASCULAR COMMENTS
Hx of HTN, HLD, CAD s/p multiple stent placement ( most recent LAD stent placement at [facility] on November 29, 2024), AAA (4.6 cm) Hx of HTN, HLD, CAD s/p 3 stents placement 2 in RCA and 1 LAD  ( most recent LAD stent placement at [facility] on November 29, 2024), AAA (4.6 cm)

## 2025-04-07 NOTE — H&P PST ADULT - LAST CARDIAC ANGIOGRAM/IMAGING
11/29/24--- s/p LAD stent at [facility] on 11/29/24 with Dr. Arrington. hx of multiple CAD Stent 11/29/24--- left sided CP--s/p LAD stent at [facility] on 11/29/24 with Dr. Arrington. total of 3 CAD stents

## 2025-04-07 NOTE — H&P PST ADULT - PRO ARRIVE FROM
Dr. Mota approved the pt to have a lesion removed by him. Information was routed to the FP PSR 2 for scheduling.    home

## 2025-04-07 NOTE — H&P PST ADULT - LAST STRESS TEST
10/2024--- it showed mild Diagonal ischemia. EF 62% 10/2024--- c/o Left sided CP--it showed mild Diagonal ischemia. EF 62%-- Copy in All scripts

## 2025-04-07 NOTE — H&P PST ADULT - NSICDXPASTSURGICALHX_GEN_ALL_CORE_FT
PAST SURGICAL HISTORY:  H/O Spinal surgery     S/P cataract surgery     Stented coronary artery

## 2025-04-07 NOTE — H&P PST ADULT - CARDIOVASCULAR
normal/regular rate and rhythm/S1 S2 present details… normal/regular rate and rhythm/S1 S2 present/pedal edema

## 2025-04-07 NOTE — H&P PST ADULT - HEART RATE (BEATS/MIN)
CONSTITUTIONAL: (-) fevers, (-) chills  EYES: (-) vision changes, (-) photophobia  ENT: (-) congestion, (-) rhinorrhea, (-) sore throat  NECK: (-) neck pain, (-) neck stiffness  CARDIO: (-) chest pain, (-) palpitations, (-) edema  PULM: see HPI, (-) cough, (-) sputum, (-) chest tightness, (-) shortness of breath, (-) wheezing, (-) hemoptysis, (-) stridor  GI: (-) nausea, (-) vomiting, (-) diarrhea, (-) constipation, (-) abdominal pain, (-) melena, (-) hematochezia  : (-) dysuria, (-) hematuria, (-) frequency, (-) urgency, (-) flank pain  ENDO: (-) polyuria, (-) polydipsia, (-) polyphagia  HEME: (-) easy bruising, (-) easy bleeding  MSK: (-) back pain, (-) myalgias  SKIN: (-) rashes, (-) pallor  NEURO: (-) headache, (-) dizziness, (-) lightheadedness, (-) weakness, (-) syncope    *all other systems negative except as documented above and in the HPI* 66

## 2025-04-07 NOTE — H&P PST ADULT - NSICDXPASTMEDICALHX_GEN_ALL_CORE_FT
PAST MEDICAL HISTORY:  Adenocarcinoma of lung     CAD (coronary artery disease)     Chronic obstructive pulmonary disease (COPD)     CKD (chronic kidney disease)     Emphysema of lung     History of aortic aneurysm     Hyperlipidemia     Hypertension     WM (obstructive sleep apnea)

## 2025-04-07 NOTE — H&P PST ADULT - RESPIRATORY
normal/clear to auscultation bilaterally/no respiratory distress/no use of accessory muscles/breath sounds equal

## 2025-04-07 NOTE — H&P PST ADULT - LAST ECHOCARDIOGRAM
2023---left ventricular systolic function is normal with an EF 70 %, he left atrium is mildly dilated in size.,  Trace MR, AR. TR-- scheduled for ECHO this week prior to surgery

## 2025-04-07 NOTE — H&P PST ADULT - BIRTH SEX
Pt phoned and it was explained to her that we do not prescribe unless we have done surgery.  Patient did not seem to understand this.  Pt encouraged to see PCP or Pain Management is she she is established with them.  Pt stated that she had been seeing pain management but she did not have a PCP.  This RN then encouraged pt to obtain a PCP.  Informing pt that Neurosurgery only prescribes for the 6 week time frame after surgery and should she end up a surgical pt she will need to be established with PCP for afterwards if not prior for medical clearance.   Male

## 2025-04-07 NOTE — H&P PST ADULT - HISTORY OF PRESENT ILLNESS
A 74-year-old male with a history of COPD, aortic aneurysm (4.6 cm), CAD (s/p 2 RCA stents in April 2022 with in stent- restenosis treated by balloon angioplasty and stenting at [facility] on May 9, 2024, and LAD stent placement at [facility] on November 29, 2024), CKD, HLD, HTN, myalgia, and WM, former smoker, was found to have an abnormal lung mass on CT scan. Biopsy confirmed adenocarcinoma of the lung. The patient was referred to Dr. Huddleston by Dr. Sanjay Nash (pulmonology).    Patient is scheduled for left video assisted thoracoscopy robotic assisted wedge resection of left lower lobe mediastinal lymph node dissection  A 74-year-old male with a history of COPD, aortic aneurysm (4.6 cm), CAD (s/p 2 RCA stents in April 2022 with in stent- restenosis treated by balloon angioplasty and stenting at [facility] on May 9, 2024, and LAD stent placement at [facility] on November 29, 2024), CKD, HLD, HTN, and WM, former smoker, was found to have an abnormal lung mass on CT scan. Biopsy confirmed adenocarcinoma of the lung. The patient was referred to Dr. Huddleston by Dr. Sanjay Nash (pulmonology).    Patient is scheduled for left video assisted thoracoscopy robotic assisted wedge resection of left lower lobe mediastinal lymph node dissection

## 2025-04-07 NOTE — H&P PST ADULT - PROBLEM SELECTOR PLAN 2
as per patient , surgeon and cardio instructed patient to stop Plavix 5 days prior to procedure , last dose 4/8/25    continue ASA therapy     Patient instructed to take metoprolol and Imdur with a sip of water on the morning of procedure. as per patient , surgeon and cardio instructed the patient to stop Plavix 5 days prior to procedure , last dose 4/8/25    and to continue ASA therapy     Patient instructed to take metoprolol and Imdur with a sip of water on the morning of procedure.    Due to a recent stent placement and the patient being scheduled for a high-risk procedure, PST is requesting a cardiac evaluation. The cardiac evaluation is scheduled to take place this week,  following the completion of pending echocardiogram (ECHO).

## 2025-04-09 PROBLEM — Z86.79 PERSONAL HISTORY OF OTHER DISEASES OF THE CIRCULATORY SYSTEM: Chronic | Status: ACTIVE | Noted: 2025-04-07

## 2025-04-09 PROBLEM — I25.10 ATHEROSCLEROTIC HEART DISEASE OF NATIVE CORONARY ARTERY WITHOUT ANGINA PECTORIS: Chronic | Status: ACTIVE | Noted: 2025-04-07

## 2025-04-09 PROBLEM — J43.9 EMPHYSEMA, UNSPECIFIED: Chronic | Status: ACTIVE | Noted: 2025-04-07

## 2025-04-09 PROBLEM — G47.33 OBSTRUCTIVE SLEEP APNEA (ADULT) (PEDIATRIC): Chronic | Status: ACTIVE | Noted: 2025-04-07

## 2025-04-09 PROBLEM — I10 ESSENTIAL (PRIMARY) HYPERTENSION: Chronic | Status: ACTIVE | Noted: 2025-04-07

## 2025-04-09 PROBLEM — E78.5 HYPERLIPIDEMIA, UNSPECIFIED: Chronic | Status: ACTIVE | Noted: 2025-04-07

## 2025-04-09 PROBLEM — J44.9 CHRONIC OBSTRUCTIVE PULMONARY DISEASE, UNSPECIFIED: Chronic | Status: ACTIVE | Noted: 2025-04-07

## 2025-04-09 PROBLEM — C34.90 MALIGNANT NEOPLASM OF UNSPECIFIED PART OF UNSPECIFIED BRONCHUS OR LUNG: Chronic | Status: ACTIVE | Noted: 2025-04-07

## 2025-04-10 ENCOUNTER — APPOINTMENT (OUTPATIENT)
Dept: CARDIOLOGY | Facility: CLINIC | Age: 75
End: 2025-04-10

## 2025-04-10 VITALS — SYSTOLIC BLOOD PRESSURE: 120 MMHG | DIASTOLIC BLOOD PRESSURE: 73 MMHG

## 2025-04-10 PROCEDURE — 93306 TTE W/DOPPLER COMPLETE: CPT

## 2025-04-14 ENCOUNTER — APPOINTMENT (OUTPATIENT)
Dept: THORACIC SURGERY | Facility: HOSPITAL | Age: 75
End: 2025-04-14

## 2025-04-14 ENCOUNTER — RESULT REVIEW (OUTPATIENT)
Age: 75
End: 2025-04-14

## 2025-04-14 ENCOUNTER — INPATIENT (INPATIENT)
Facility: HOSPITAL | Age: 75
LOS: 4 days | Discharge: HOME HEALTH | End: 2025-04-19
Attending: THORACIC SURGERY (CARDIOTHORACIC VASCULAR SURGERY) | Admitting: THORACIC SURGERY (CARDIOTHORACIC VASCULAR SURGERY)
Payer: MEDICARE

## 2025-04-14 VITALS
WEIGHT: 184.97 LBS | TEMPERATURE: 99 F | RESPIRATION RATE: 17 BRPM | SYSTOLIC BLOOD PRESSURE: 130 MMHG | DIASTOLIC BLOOD PRESSURE: 72 MMHG | HEIGHT: 66 IN | HEART RATE: 63 BPM | OXYGEN SATURATION: 96 %

## 2025-04-14 DIAGNOSIS — Z95.5 PRESENCE OF CORONARY ANGIOPLASTY IMPLANT AND GRAFT: Chronic | ICD-10-CM

## 2025-04-14 DIAGNOSIS — Z98.890 OTHER SPECIFIED POSTPROCEDURAL STATES: Chronic | ICD-10-CM

## 2025-04-14 DIAGNOSIS — C34.92 MALIGNANT NEOPLASM OF UNSPECIFIED PART OF LEFT BRONCHUS OR LUNG: ICD-10-CM

## 2025-04-14 DIAGNOSIS — Z98.49 CATARACT EXTRACTION STATUS, UNSPECIFIED EYE: Chronic | ICD-10-CM

## 2025-04-14 PROCEDURE — 88305 TISSUE EXAM BY PATHOLOGIST: CPT | Mod: 26

## 2025-04-14 PROCEDURE — 32652 THORACOSCOPY REM TOTL CORTEX: CPT | Mod: AS,LT

## 2025-04-14 PROCEDURE — 32674 THORACOSCOPY LYMPH NODE EXC: CPT | Mod: LT

## 2025-04-14 PROCEDURE — 32666 THORACOSCOPY W/WEDGE RESECT: CPT | Mod: LT

## 2025-04-14 PROCEDURE — 99233 SBSQ HOSP IP/OBS HIGH 50: CPT

## 2025-04-14 PROCEDURE — 88307 TISSUE EXAM BY PATHOLOGIST: CPT | Mod: 26

## 2025-04-14 PROCEDURE — 32666 THORACOSCOPY W/WEDGE RESECT: CPT | Mod: AS,LT

## 2025-04-14 PROCEDURE — 88313 SPECIAL STAINS GROUP 2: CPT | Mod: 26

## 2025-04-14 PROCEDURE — S2900 ROBOTIC SURGICAL SYSTEM: CPT | Mod: NC

## 2025-04-14 PROCEDURE — 71045 X-RAY EXAM CHEST 1 VIEW: CPT | Mod: 26

## 2025-04-14 PROCEDURE — 32674 THORACOSCOPY LYMPH NODE EXC: CPT | Mod: AS,LT

## 2025-04-14 PROCEDURE — 88360 TUMOR IMMUNOHISTOCHEM/MANUAL: CPT | Mod: 26

## 2025-04-14 PROCEDURE — 32652 THORACOSCOPY REM TOTL CORTEX: CPT | Mod: LT

## 2025-04-14 DEVICE — SURGICEL 2 X 14": Type: IMPLANTABLE DEVICE | Site: LEFT | Status: FUNCTIONAL

## 2025-04-14 DEVICE — CHEST DRAIN PLEUR-EVAC 28FR STRAIGHT: Type: IMPLANTABLE DEVICE | Site: LEFT | Status: FUNCTIONAL

## 2025-04-14 DEVICE — STAPLER COVIDIEN TRI-STAPLE 45MM BLACK RELOAD: Type: IMPLANTABLE DEVICE | Site: LEFT | Status: FUNCTIONAL

## 2025-04-14 DEVICE — STAPLER COVIDIEN TRI-STAPLE 45MM PURPLE RELOAD: Type: IMPLANTABLE DEVICE | Site: LEFT | Status: FUNCTIONAL

## 2025-04-14 DEVICE — STAPLER COVIDIEN TRI-STAPLE 60MM BLACK RELOAD: Type: IMPLANTABLE DEVICE | Site: LEFT | Status: FUNCTIONAL

## 2025-04-14 RX ORDER — HEPARIN SODIUM 1000 [USP'U]/ML
5000 INJECTION INTRAVENOUS; SUBCUTANEOUS EVERY 8 HOURS
Refills: 0 | Status: DISCONTINUED | OUTPATIENT
Start: 2025-04-14 | End: 2025-04-19

## 2025-04-14 RX ORDER — ONDANSETRON HCL/PF 4 MG/2 ML
4 VIAL (ML) INJECTION EVERY 6 HOURS
Refills: 0 | Status: DISCONTINUED | OUTPATIENT
Start: 2025-04-14 | End: 2025-04-19

## 2025-04-14 RX ORDER — ATORVASTATIN CALCIUM 80 MG/1
80 TABLET, FILM COATED ORAL AT BEDTIME
Refills: 0 | Status: DISCONTINUED | OUTPATIENT
Start: 2025-04-14 | End: 2025-04-19

## 2025-04-14 RX ORDER — SODIUM CHLORIDE 9 G/1000ML
500 INJECTION, SOLUTION INTRAVENOUS
Refills: 0 | Status: DISCONTINUED | OUTPATIENT
Start: 2025-04-14 | End: 2025-04-15

## 2025-04-14 RX ORDER — SODIUM BICARBONATE 1 MEQ/ML
650 SYRINGE (ML) INTRAVENOUS
Refills: 0 | DISCHARGE

## 2025-04-14 RX ORDER — SODIUM BICARBONATE 1 MEQ/ML
650 SYRINGE (ML) INTRAVENOUS DAILY
Refills: 0 | Status: DISCONTINUED | OUTPATIENT
Start: 2025-04-14 | End: 2025-04-19

## 2025-04-14 RX ORDER — LIDOCAINE HYDROCHLORIDE 20 MG/ML
1 JELLY TOPICAL DAILY
Refills: 0 | Status: DISCONTINUED | OUTPATIENT
Start: 2025-04-14 | End: 2025-04-19

## 2025-04-14 RX ORDER — B1/B2/B3/B5/B6/B12/VIT C/FOLIC 500-0.5 MG
1 TABLET ORAL
Refills: 0 | DISCHARGE

## 2025-04-14 RX ORDER — HYDROMORPHONE/SOD CHLOR,ISO/PF 2 MG/10 ML
30 SYRINGE (ML) INJECTION
Refills: 0 | Status: DISCONTINUED | OUTPATIENT
Start: 2025-04-14 | End: 2025-04-15

## 2025-04-14 RX ORDER — NALBUPHINE HYDROCHLORIDE 10 MG/ML
2.5 INJECTION INTRAMUSCULAR; INTRAVENOUS; SUBCUTANEOUS EVERY 6 HOURS
Refills: 0 | Status: DISCONTINUED | OUTPATIENT
Start: 2025-04-14 | End: 2025-04-15

## 2025-04-14 RX ORDER — NALOXONE HYDROCHLORIDE 0.4 MG/ML
0.1 INJECTION, SOLUTION INTRAMUSCULAR; INTRAVENOUS; SUBCUTANEOUS
Refills: 0 | Status: DISCONTINUED | OUTPATIENT
Start: 2025-04-14 | End: 2025-04-19

## 2025-04-14 RX ORDER — ISOSORBIDE MONONITRATE 60 MG/1
1 TABLET, EXTENDED RELEASE ORAL
Refills: 0 | DISCHARGE

## 2025-04-14 RX ORDER — POLYETHYLENE GLYCOL 3350 17 G/17G
17 POWDER, FOR SOLUTION ORAL AT BEDTIME
Refills: 0 | Status: DISCONTINUED | OUTPATIENT
Start: 2025-04-14 | End: 2025-04-19

## 2025-04-14 RX ORDER — HYDROMORPHONE/SOD CHLOR,ISO/PF 2 MG/10 ML
0.5 SYRINGE (ML) INJECTION
Refills: 0 | Status: DISCONTINUED | OUTPATIENT
Start: 2025-04-14 | End: 2025-04-15

## 2025-04-14 RX ORDER — ALBUTEROL SULFATE 2.5 MG/3ML
2.5 VIAL, NEBULIZER (ML) INHALATION EVERY 6 HOURS
Refills: 0 | Status: DISCONTINUED | OUTPATIENT
Start: 2025-04-14 | End: 2025-04-19

## 2025-04-14 RX ORDER — ATORVASTATIN CALCIUM 80 MG/1
1 TABLET, FILM COATED ORAL
Refills: 0 | DISCHARGE

## 2025-04-14 RX ORDER — SENNA 187 MG
2 TABLET ORAL AT BEDTIME
Refills: 0 | Status: DISCONTINUED | OUTPATIENT
Start: 2025-04-14 | End: 2025-04-15

## 2025-04-14 RX ORDER — ACETAMINOPHEN 500 MG/5ML
975 LIQUID (ML) ORAL ONCE
Refills: 0 | Status: COMPLETED | OUTPATIENT
Start: 2025-04-14 | End: 2025-04-14

## 2025-04-14 RX ORDER — ACETAMINOPHEN 500 MG/5ML
1000 LIQUID (ML) ORAL EVERY 6 HOURS
Refills: 0 | Status: COMPLETED | OUTPATIENT
Start: 2025-04-14 | End: 2025-04-15

## 2025-04-14 RX ORDER — ASPIRIN 325 MG
1 TABLET ORAL
Refills: 0 | DISCHARGE

## 2025-04-14 RX ORDER — B1/B2/B3/B5/B6/B12/VIT C/FOLIC 500-0.5 MG
1 TABLET ORAL DAILY
Refills: 0 | Status: DISCONTINUED | OUTPATIENT
Start: 2025-04-14 | End: 2025-04-19

## 2025-04-14 RX ORDER — METOPROLOL SUCCINATE 50 MG/1
1 TABLET, EXTENDED RELEASE ORAL
Refills: 0 | DISCHARGE

## 2025-04-14 RX ORDER — ASPIRIN 325 MG
81 TABLET ORAL DAILY
Refills: 0 | Status: DISCONTINUED | OUTPATIENT
Start: 2025-04-15 | End: 2025-04-19

## 2025-04-14 RX ADMIN — Medication 30 MILLILITER(S): at 17:36

## 2025-04-14 RX ADMIN — Medication 30 MILLILITER(S): at 20:13

## 2025-04-14 RX ADMIN — Medication 400 MILLIGRAM(S): at 17:35

## 2025-04-14 RX ADMIN — Medication 400 MILLIGRAM(S): at 23:13

## 2025-04-14 RX ADMIN — Medication 975 MILLIGRAM(S): at 11:00

## 2025-04-14 RX ADMIN — Medication 2 TABLET(S): at 21:29

## 2025-04-14 RX ADMIN — SODIUM CHLORIDE 30 MILLILITER(S): 9 INJECTION, SOLUTION INTRAVENOUS at 17:36

## 2025-04-14 RX ADMIN — HEPARIN SODIUM 5000 UNIT(S): 1000 INJECTION INTRAVENOUS; SUBCUTANEOUS at 21:30

## 2025-04-14 RX ADMIN — Medication 4 MILLILITER(S): at 21:25

## 2025-04-14 RX ADMIN — SODIUM CHLORIDE 30 MILLILITER(S): 9 INJECTION, SOLUTION INTRAVENOUS at 20:12

## 2025-04-14 RX ADMIN — Medication 2.5 MILLIGRAM(S): at 21:24

## 2025-04-14 RX ADMIN — ATORVASTATIN CALCIUM 80 MILLIGRAM(S): 80 TABLET, FILM COATED ORAL at 21:29

## 2025-04-15 ENCOUNTER — TRANSCRIPTION ENCOUNTER (OUTPATIENT)
Age: 75
End: 2025-04-15

## 2025-04-15 LAB
ANION GAP SERPL CALC-SCNC: 18 MMOL/L — HIGH (ref 7–14)
BUN SERPL-MCNC: 24 MG/DL — HIGH (ref 7–23)
CALCIUM SERPL-MCNC: 9.2 MG/DL — SIGNIFICANT CHANGE UP (ref 8.4–10.5)
CHLORIDE SERPL-SCNC: 101 MMOL/L — SIGNIFICANT CHANGE UP (ref 98–107)
CO2 SERPL-SCNC: 19 MMOL/L — LOW (ref 22–31)
CREAT SERPL-MCNC: 1.49 MG/DL — HIGH (ref 0.5–1.3)
EGFR: 49 ML/MIN/1.73M2 — LOW
EGFR: 49 ML/MIN/1.73M2 — LOW
GLUCOSE SERPL-MCNC: 144 MG/DL — HIGH (ref 70–99)
HCT VFR BLD CALC: 39 % — SIGNIFICANT CHANGE UP (ref 39–50)
HGB BLD-MCNC: 13.1 G/DL — SIGNIFICANT CHANGE UP (ref 13–17)
MAGNESIUM SERPL-MCNC: 1.9 MG/DL — SIGNIFICANT CHANGE UP (ref 1.6–2.6)
MCHC RBC-ENTMCNC: 33.6 G/DL — SIGNIFICANT CHANGE UP (ref 32–36)
MCHC RBC-ENTMCNC: 36.1 PG — HIGH (ref 27–34)
MCV RBC AUTO: 107.4 FL — HIGH (ref 80–100)
MRSA PCR RESULT.: SIGNIFICANT CHANGE UP
NRBC # BLD AUTO: 0 K/UL — SIGNIFICANT CHANGE UP (ref 0–0)
NRBC # FLD: 0 K/UL — SIGNIFICANT CHANGE UP (ref 0–0)
NRBC BLD AUTO-RTO: 0 /100 WBCS — SIGNIFICANT CHANGE UP (ref 0–0)
PHOSPHATE SERPL-MCNC: 4.9 MG/DL — HIGH (ref 2.5–4.5)
PLATELET # BLD AUTO: 122 K/UL — LOW (ref 150–400)
POTASSIUM SERPL-MCNC: 4.4 MMOL/L — SIGNIFICANT CHANGE UP (ref 3.5–5.3)
POTASSIUM SERPL-SCNC: 4.4 MMOL/L — SIGNIFICANT CHANGE UP (ref 3.5–5.3)
RBC # BLD: 3.63 M/UL — LOW (ref 4.2–5.8)
RBC # FLD: 13.1 % — SIGNIFICANT CHANGE UP (ref 10.3–14.5)
S AUREUS DNA NOSE QL NAA+PROBE: SIGNIFICANT CHANGE UP
SODIUM SERPL-SCNC: 138 MMOL/L — SIGNIFICANT CHANGE UP (ref 135–145)
WBC # BLD: 7.52 K/UL — SIGNIFICANT CHANGE UP (ref 3.8–10.5)
WBC # FLD AUTO: 7.52 K/UL — SIGNIFICANT CHANGE UP (ref 3.8–10.5)

## 2025-04-15 PROCEDURE — 99233 SBSQ HOSP IP/OBS HIGH 50: CPT

## 2025-04-15 PROCEDURE — 71045 X-RAY EXAM CHEST 1 VIEW: CPT | Mod: 26,76

## 2025-04-15 RX ORDER — DORNASE ALFA 1 MG/ML
2.5 SOLUTION RESPIRATORY (INHALATION) DAILY
Refills: 0 | Status: DISCONTINUED | OUTPATIENT
Start: 2025-04-15 | End: 2025-04-19

## 2025-04-15 RX ORDER — SENNA 187 MG
2 TABLET ORAL AT BEDTIME
Refills: 0 | Status: DISCONTINUED | OUTPATIENT
Start: 2025-04-15 | End: 2025-04-19

## 2025-04-15 RX ORDER — OXYCODONE HYDROCHLORIDE 30 MG/1
10 TABLET ORAL
Refills: 0 | Status: DISCONTINUED | OUTPATIENT
Start: 2025-04-15 | End: 2025-04-19

## 2025-04-15 RX ORDER — TAMSULOSIN HYDROCHLORIDE 0.4 MG/1
0.4 CAPSULE ORAL DAILY
Refills: 0 | Status: DISCONTINUED | OUTPATIENT
Start: 2025-04-15 | End: 2025-04-19

## 2025-04-15 RX ORDER — OXYCODONE HYDROCHLORIDE 30 MG/1
5 TABLET ORAL
Refills: 0 | Status: DISCONTINUED | OUTPATIENT
Start: 2025-04-15 | End: 2025-04-19

## 2025-04-15 RX ORDER — METOPROLOL SUCCINATE 50 MG/1
50 TABLET, EXTENDED RELEASE ORAL DAILY
Refills: 0 | Status: DISCONTINUED | OUTPATIENT
Start: 2025-04-15 | End: 2025-04-19

## 2025-04-15 RX ORDER — METOCLOPRAMIDE HCL 10 MG
10 TABLET ORAL ONCE
Refills: 0 | Status: COMPLETED | OUTPATIENT
Start: 2025-04-15 | End: 2025-04-15

## 2025-04-15 RX ORDER — FUROSEMIDE 10 MG/ML
10 INJECTION INTRAMUSCULAR; INTRAVENOUS ONCE
Refills: 0 | Status: COMPLETED | OUTPATIENT
Start: 2025-04-15 | End: 2025-04-15

## 2025-04-15 RX ADMIN — Medication 2.5 MILLIGRAM(S): at 03:32

## 2025-04-15 RX ADMIN — Medication 2.5 MILLIGRAM(S): at 21:24

## 2025-04-15 RX ADMIN — Medication 1 TABLET(S): at 12:24

## 2025-04-15 RX ADMIN — Medication 30 MILLILITER(S): at 08:25

## 2025-04-15 RX ADMIN — HEPARIN SODIUM 5000 UNIT(S): 1000 INJECTION INTRAVENOUS; SUBCUTANEOUS at 13:22

## 2025-04-15 RX ADMIN — HEPARIN SODIUM 5000 UNIT(S): 1000 INJECTION INTRAVENOUS; SUBCUTANEOUS at 21:31

## 2025-04-15 RX ADMIN — Medication 400 MILLIGRAM(S): at 05:12

## 2025-04-15 RX ADMIN — TAMSULOSIN HYDROCHLORIDE 0.4 MILLIGRAM(S): 0.4 CAPSULE ORAL at 12:23

## 2025-04-15 RX ADMIN — HEPARIN SODIUM 5000 UNIT(S): 1000 INJECTION INTRAVENOUS; SUBCUTANEOUS at 05:13

## 2025-04-15 RX ADMIN — Medication 1 DOSE(S): at 21:29

## 2025-04-15 RX ADMIN — POLYETHYLENE GLYCOL 3350 17 GRAM(S): 17 POWDER, FOR SOLUTION ORAL at 21:31

## 2025-04-15 RX ADMIN — Medication 400 MILLIGRAM(S): at 12:23

## 2025-04-15 RX ADMIN — Medication 1 APPLICATION(S): at 12:26

## 2025-04-15 RX ADMIN — FUROSEMIDE 10 MILLIGRAM(S): 10 INJECTION INTRAMUSCULAR; INTRAVENOUS at 10:03

## 2025-04-15 RX ADMIN — TAMSULOSIN HYDROCHLORIDE 0.4 MILLIGRAM(S): 0.4 CAPSULE ORAL at 06:19

## 2025-04-15 RX ADMIN — Medication 4 MILLILITER(S): at 08:40

## 2025-04-15 RX ADMIN — Medication 2.5 MILLIGRAM(S): at 08:40

## 2025-04-15 RX ADMIN — Medication 1000 MILLIGRAM(S): at 13:00

## 2025-04-15 RX ADMIN — ATORVASTATIN CALCIUM 80 MILLIGRAM(S): 80 TABLET, FILM COATED ORAL at 21:31

## 2025-04-15 RX ADMIN — Medication 2 TABLET(S): at 21:31

## 2025-04-15 RX ADMIN — Medication 650 MILLIGRAM(S): at 12:25

## 2025-04-15 RX ADMIN — Medication 10 MILLIGRAM(S): at 12:24

## 2025-04-15 RX ADMIN — SODIUM CHLORIDE 30 MILLILITER(S): 9 INJECTION, SOLUTION INTRAVENOUS at 08:24

## 2025-04-15 RX ADMIN — Medication 10 MILLIGRAM(S): at 07:34

## 2025-04-15 RX ADMIN — Medication 4 MILLILITER(S): at 21:24

## 2025-04-15 RX ADMIN — Medication 2.5 MILLIGRAM(S): at 15:19

## 2025-04-15 RX ADMIN — Medication 81 MILLIGRAM(S): at 12:24

## 2025-04-16 LAB
ANION GAP SERPL CALC-SCNC: 11 MMOL/L — SIGNIFICANT CHANGE UP (ref 7–14)
BUN SERPL-MCNC: 32 MG/DL — HIGH (ref 7–23)
CALCIUM SERPL-MCNC: 9.3 MG/DL — SIGNIFICANT CHANGE UP (ref 8.4–10.5)
CHLORIDE SERPL-SCNC: 107 MMOL/L — SIGNIFICANT CHANGE UP (ref 98–107)
CO2 SERPL-SCNC: 23 MMOL/L — SIGNIFICANT CHANGE UP (ref 22–31)
CREAT SERPL-MCNC: 1.37 MG/DL — HIGH (ref 0.5–1.3)
EGFR: 54 ML/MIN/1.73M2 — LOW
EGFR: 54 ML/MIN/1.73M2 — LOW
GLUCOSE SERPL-MCNC: 117 MG/DL — HIGH (ref 70–99)
HCT VFR BLD CALC: 33.5 % — LOW (ref 39–50)
HGB BLD-MCNC: 11.6 G/DL — LOW (ref 13–17)
MAGNESIUM SERPL-MCNC: 1.9 MG/DL — SIGNIFICANT CHANGE UP (ref 1.6–2.6)
MCHC RBC-ENTMCNC: 34.6 G/DL — SIGNIFICANT CHANGE UP (ref 32–36)
MCHC RBC-ENTMCNC: 35.8 PG — HIGH (ref 27–34)
MCV RBC AUTO: 103.4 FL — HIGH (ref 80–100)
NRBC # BLD AUTO: 0 K/UL — SIGNIFICANT CHANGE UP (ref 0–0)
NRBC # FLD: 0 K/UL — SIGNIFICANT CHANGE UP (ref 0–0)
NRBC BLD AUTO-RTO: 0 /100 WBCS — SIGNIFICANT CHANGE UP (ref 0–0)
PHOSPHATE SERPL-MCNC: 3.7 MG/DL — SIGNIFICANT CHANGE UP (ref 2.5–4.5)
PLATELET # BLD AUTO: 107 K/UL — LOW (ref 150–400)
POTASSIUM SERPL-MCNC: 4.6 MMOL/L — SIGNIFICANT CHANGE UP (ref 3.5–5.3)
POTASSIUM SERPL-SCNC: 4.6 MMOL/L — SIGNIFICANT CHANGE UP (ref 3.5–5.3)
RBC # BLD: 3.24 M/UL — LOW (ref 4.2–5.8)
RBC # FLD: 12.7 % — SIGNIFICANT CHANGE UP (ref 10.3–14.5)
SODIUM SERPL-SCNC: 141 MMOL/L — SIGNIFICANT CHANGE UP (ref 135–145)
WBC # BLD: 8.18 K/UL — SIGNIFICANT CHANGE UP (ref 3.8–10.5)
WBC # FLD AUTO: 8.18 K/UL — SIGNIFICANT CHANGE UP (ref 3.8–10.5)

## 2025-04-16 PROCEDURE — 71045 X-RAY EXAM CHEST 1 VIEW: CPT | Mod: 26

## 2025-04-16 RX ORDER — CLOPIDOGREL BISULFATE 75 MG/1
75 TABLET, FILM COATED ORAL DAILY
Refills: 0 | Status: DISCONTINUED | OUTPATIENT
Start: 2025-04-16 | End: 2025-04-19

## 2025-04-16 RX ORDER — FUROSEMIDE 10 MG/ML
20 INJECTION INTRAMUSCULAR; INTRAVENOUS ONCE
Refills: 0 | Status: COMPLETED | OUTPATIENT
Start: 2025-04-16 | End: 2025-04-16

## 2025-04-16 RX ADMIN — OXYCODONE HYDROCHLORIDE 5 MILLIGRAM(S): 30 TABLET ORAL at 03:56

## 2025-04-16 RX ADMIN — HEPARIN SODIUM 5000 UNIT(S): 1000 INJECTION INTRAVENOUS; SUBCUTANEOUS at 05:36

## 2025-04-16 RX ADMIN — METOPROLOL SUCCINATE 50 MILLIGRAM(S): 50 TABLET, EXTENDED RELEASE ORAL at 05:35

## 2025-04-16 RX ADMIN — Medication 81 MILLIGRAM(S): at 08:41

## 2025-04-16 RX ADMIN — TAMSULOSIN HYDROCHLORIDE 0.4 MILLIGRAM(S): 0.4 CAPSULE ORAL at 08:42

## 2025-04-16 RX ADMIN — Medication 4 MILLILITER(S): at 10:10

## 2025-04-16 RX ADMIN — Medication 4 MILLILITER(S): at 20:01

## 2025-04-16 RX ADMIN — OXYCODONE HYDROCHLORIDE 5 MILLIGRAM(S): 30 TABLET ORAL at 13:40

## 2025-04-16 RX ADMIN — OXYCODONE HYDROCHLORIDE 5 MILLIGRAM(S): 30 TABLET ORAL at 04:30

## 2025-04-16 RX ADMIN — Medication 1 TABLET(S): at 12:15

## 2025-04-16 RX ADMIN — Medication 1 APPLICATION(S): at 08:43

## 2025-04-16 RX ADMIN — Medication 1 DOSE(S): at 21:48

## 2025-04-16 RX ADMIN — Medication 2.5 MILLIGRAM(S): at 14:43

## 2025-04-16 RX ADMIN — Medication 650 MILLIGRAM(S): at 08:42

## 2025-04-16 RX ADMIN — LIDOCAINE HYDROCHLORIDE 1 PATCH: 20 JELLY TOPICAL at 12:16

## 2025-04-16 RX ADMIN — Medication 2.5 MILLIGRAM(S): at 03:54

## 2025-04-16 RX ADMIN — HEPARIN SODIUM 5000 UNIT(S): 1000 INJECTION INTRAVENOUS; SUBCUTANEOUS at 21:48

## 2025-04-16 RX ADMIN — POLYETHYLENE GLYCOL 3350 17 GRAM(S): 17 POWDER, FOR SOLUTION ORAL at 21:48

## 2025-04-16 RX ADMIN — Medication 2.5 MILLIGRAM(S): at 20:01

## 2025-04-16 RX ADMIN — HEPARIN SODIUM 5000 UNIT(S): 1000 INJECTION INTRAVENOUS; SUBCUTANEOUS at 13:40

## 2025-04-16 RX ADMIN — DORNASE ALFA 2.5 MILLIGRAM(S): 1 SOLUTION RESPIRATORY (INHALATION) at 10:10

## 2025-04-16 RX ADMIN — Medication 2 TABLET(S): at 21:48

## 2025-04-16 RX ADMIN — Medication 1 DOSE(S): at 08:39

## 2025-04-16 RX ADMIN — CLOPIDOGREL BISULFATE 75 MILLIGRAM(S): 75 TABLET, FILM COATED ORAL at 12:17

## 2025-04-16 RX ADMIN — FUROSEMIDE 20 MILLIGRAM(S): 10 INJECTION INTRAMUSCULAR; INTRAVENOUS at 12:10

## 2025-04-16 RX ADMIN — Medication 10 MILLIGRAM(S): at 08:42

## 2025-04-16 RX ADMIN — Medication 2.5 MILLIGRAM(S): at 10:09

## 2025-04-16 RX ADMIN — ATORVASTATIN CALCIUM 80 MILLIGRAM(S): 80 TABLET, FILM COATED ORAL at 21:48

## 2025-04-16 RX ADMIN — LIDOCAINE HYDROCHLORIDE 1 PATCH: 20 JELLY TOPICAL at 18:51

## 2025-04-16 RX ADMIN — OXYCODONE HYDROCHLORIDE 5 MILLIGRAM(S): 30 TABLET ORAL at 14:10

## 2025-04-17 ENCOUNTER — TRANSCRIPTION ENCOUNTER (OUTPATIENT)
Age: 75
End: 2025-04-17

## 2025-04-17 LAB
ALBUMIN SERPL ELPH-MCNC: 3.7 G/DL — SIGNIFICANT CHANGE UP (ref 3.3–5)
ALP SERPL-CCNC: 71 U/L — SIGNIFICANT CHANGE UP (ref 40–120)
ALT FLD-CCNC: 39 U/L — SIGNIFICANT CHANGE UP (ref 4–41)
ANION GAP SERPL CALC-SCNC: 14 MMOL/L — SIGNIFICANT CHANGE UP (ref 7–14)
ANION GAP SERPL CALC-SCNC: 15 MMOL/L — HIGH (ref 7–14)
APPEARANCE UR: CLEAR — SIGNIFICANT CHANGE UP
AST SERPL-CCNC: 54 U/L — HIGH (ref 4–40)
BACTERIA # UR AUTO: NEGATIVE /HPF — SIGNIFICANT CHANGE UP
BILIRUB SERPL-MCNC: 1.7 MG/DL — HIGH (ref 0.2–1.2)
BILIRUB UR-MCNC: NEGATIVE — SIGNIFICANT CHANGE UP
BUN SERPL-MCNC: 26 MG/DL — HIGH (ref 7–23)
BUN SERPL-MCNC: 26 MG/DL — HIGH (ref 7–23)
CALCIUM SERPL-MCNC: 9.3 MG/DL — SIGNIFICANT CHANGE UP (ref 8.4–10.5)
CALCIUM SERPL-MCNC: 9.3 MG/DL — SIGNIFICANT CHANGE UP (ref 8.4–10.5)
CAST: 1 /LPF — SIGNIFICANT CHANGE UP (ref 0–4)
CHLORIDE SERPL-SCNC: 101 MMOL/L — SIGNIFICANT CHANGE UP (ref 98–107)
CHLORIDE SERPL-SCNC: 99 MMOL/L — SIGNIFICANT CHANGE UP (ref 98–107)
CO2 SERPL-SCNC: 20 MMOL/L — LOW (ref 22–31)
CO2 SERPL-SCNC: 22 MMOL/L — SIGNIFICANT CHANGE UP (ref 22–31)
COLOR SPEC: SIGNIFICANT CHANGE UP
CREAT SERPL-MCNC: 1.19 MG/DL — SIGNIFICANT CHANGE UP (ref 0.5–1.3)
CREAT SERPL-MCNC: 1.21 MG/DL — SIGNIFICANT CHANGE UP (ref 0.5–1.3)
DIFF PNL FLD: NEGATIVE — SIGNIFICANT CHANGE UP
EGFR: 63 ML/MIN/1.73M2 — SIGNIFICANT CHANGE UP
EGFR: 63 ML/MIN/1.73M2 — SIGNIFICANT CHANGE UP
EGFR: 64 ML/MIN/1.73M2 — SIGNIFICANT CHANGE UP
EGFR: 64 ML/MIN/1.73M2 — SIGNIFICANT CHANGE UP
FLUAV AG NPH QL: SIGNIFICANT CHANGE UP
FLUBV AG NPH QL: SIGNIFICANT CHANGE UP
GLUCOSE SERPL-MCNC: 108 MG/DL — HIGH (ref 70–99)
GLUCOSE SERPL-MCNC: 118 MG/DL — HIGH (ref 70–99)
GLUCOSE UR QL: NEGATIVE MG/DL — SIGNIFICANT CHANGE UP
HCT VFR BLD CALC: 33.7 % — LOW (ref 39–50)
HCT VFR BLD CALC: 36.2 % — LOW (ref 39–50)
HGB BLD-MCNC: 12.1 G/DL — LOW (ref 13–17)
HGB BLD-MCNC: 12.7 G/DL — LOW (ref 13–17)
KETONES UR-MCNC: ABNORMAL MG/DL
LACTATE SERPL-SCNC: 1.8 MMOL/L — SIGNIFICANT CHANGE UP (ref 0.5–2)
LEUKOCYTE ESTERASE UR-ACNC: ABNORMAL
MCHC RBC-ENTMCNC: 35.1 G/DL — SIGNIFICANT CHANGE UP (ref 32–36)
MCHC RBC-ENTMCNC: 35.9 G/DL — SIGNIFICANT CHANGE UP (ref 32–36)
MCHC RBC-ENTMCNC: 35.9 PG — HIGH (ref 27–34)
MCHC RBC-ENTMCNC: 36.2 PG — HIGH (ref 27–34)
MCV RBC AUTO: 100 FL — SIGNIFICANT CHANGE UP (ref 80–100)
MCV RBC AUTO: 103.1 FL — HIGH (ref 80–100)
NITRITE UR-MCNC: NEGATIVE — SIGNIFICANT CHANGE UP
NRBC # BLD AUTO: 0 K/UL — SIGNIFICANT CHANGE UP (ref 0–0)
NRBC # BLD AUTO: 0 K/UL — SIGNIFICANT CHANGE UP (ref 0–0)
NRBC # FLD: 0 K/UL — SIGNIFICANT CHANGE UP (ref 0–0)
NRBC # FLD: 0 K/UL — SIGNIFICANT CHANGE UP (ref 0–0)
NRBC BLD AUTO-RTO: 0 /100 WBCS — SIGNIFICANT CHANGE UP (ref 0–0)
NRBC BLD AUTO-RTO: 0 /100 WBCS — SIGNIFICANT CHANGE UP (ref 0–0)
PH UR: 6 — SIGNIFICANT CHANGE UP (ref 5–8)
PLATELET # BLD AUTO: 114 K/UL — LOW (ref 150–400)
PLATELET # BLD AUTO: 119 K/UL — LOW (ref 150–400)
POTASSIUM SERPL-MCNC: 3.9 MMOL/L — SIGNIFICANT CHANGE UP (ref 3.5–5.3)
POTASSIUM SERPL-MCNC: 4.2 MMOL/L — SIGNIFICANT CHANGE UP (ref 3.5–5.3)
POTASSIUM SERPL-SCNC: 3.9 MMOL/L — SIGNIFICANT CHANGE UP (ref 3.5–5.3)
POTASSIUM SERPL-SCNC: 4.2 MMOL/L — SIGNIFICANT CHANGE UP (ref 3.5–5.3)
PROT SERPL-MCNC: 6.9 G/DL — SIGNIFICANT CHANGE UP (ref 6–8.3)
PROT UR-MCNC: 30 MG/DL
RBC # BLD: 3.37 M/UL — LOW (ref 4.2–5.8)
RBC # BLD: 3.51 M/UL — LOW (ref 4.2–5.8)
RBC # FLD: 13 % — SIGNIFICANT CHANGE UP (ref 10.3–14.5)
RBC # FLD: 13.1 % — SIGNIFICANT CHANGE UP (ref 10.3–14.5)
RBC CASTS # UR COMP ASSIST: 2 /HPF — SIGNIFICANT CHANGE UP (ref 0–4)
RSV RNA NPH QL NAA+NON-PROBE: SIGNIFICANT CHANGE UP
SARS-COV-2 RNA SPEC QL NAA+PROBE: SIGNIFICANT CHANGE UP
SODIUM SERPL-SCNC: 133 MMOL/L — LOW (ref 135–145)
SODIUM SERPL-SCNC: 138 MMOL/L — SIGNIFICANT CHANGE UP (ref 135–145)
SOURCE RESPIRATORY: SIGNIFICANT CHANGE UP
SP GR SPEC: 1.02 — SIGNIFICANT CHANGE UP (ref 1–1.03)
SQUAMOUS # UR AUTO: 1 /HPF — SIGNIFICANT CHANGE UP (ref 0–5)
UROBILINOGEN FLD QL: 1 MG/DL — SIGNIFICANT CHANGE UP (ref 0.2–1)
WBC # BLD: 10.38 K/UL — SIGNIFICANT CHANGE UP (ref 3.8–10.5)
WBC # BLD: 7.95 K/UL — SIGNIFICANT CHANGE UP (ref 3.8–10.5)
WBC # FLD AUTO: 10.38 K/UL — SIGNIFICANT CHANGE UP (ref 3.8–10.5)
WBC # FLD AUTO: 7.95 K/UL — SIGNIFICANT CHANGE UP (ref 3.8–10.5)
WBC UR QL: 0 /HPF — SIGNIFICANT CHANGE UP (ref 0–5)

## 2025-04-17 PROCEDURE — 71045 X-RAY EXAM CHEST 1 VIEW: CPT | Mod: 26,76

## 2025-04-17 PROCEDURE — 93010 ELECTROCARDIOGRAM REPORT: CPT

## 2025-04-17 RX ORDER — BISACODYL 5 MG
10 TABLET, DELAYED RELEASE (ENTERIC COATED) ORAL ONCE
Refills: 0 | Status: COMPLETED | OUTPATIENT
Start: 2025-04-17 | End: 2025-04-17

## 2025-04-17 RX ORDER — FINASTERIDE 1 MG/1
5 TABLET, FILM COATED ORAL DAILY
Refills: 0 | Status: DISCONTINUED | OUTPATIENT
Start: 2025-04-17 | End: 2025-04-19

## 2025-04-17 RX ORDER — SALINE 7; 19 G/118ML; G/118ML
1 ENEMA RECTAL ONCE
Refills: 0 | Status: COMPLETED | OUTPATIENT
Start: 2025-04-17 | End: 2025-04-17

## 2025-04-17 RX ORDER — PIPERACILLIN-TAZO-DEXTROSE,ISO 3.375G/5
3.38 IV SOLUTION, PIGGYBACK PREMIX FROZEN(ML) INTRAVENOUS EVERY 8 HOURS
Refills: 0 | Status: DISCONTINUED | OUTPATIENT
Start: 2025-04-17 | End: 2025-04-19

## 2025-04-17 RX ORDER — ACETAMINOPHEN 500 MG/5ML
1000 LIQUID (ML) ORAL EVERY 8 HOURS
Refills: 0 | Status: DISCONTINUED | OUTPATIENT
Start: 2025-04-17 | End: 2025-04-19

## 2025-04-17 RX ORDER — PIPERACILLIN-TAZO-DEXTROSE,ISO 3.375G/5
3.38 IV SOLUTION, PIGGYBACK PREMIX FROZEN(ML) INTRAVENOUS ONCE
Refills: 0 | Status: COMPLETED | OUTPATIENT
Start: 2025-04-17 | End: 2025-04-17

## 2025-04-17 RX ORDER — ACETAMINOPHEN 500 MG/5ML
1000 LIQUID (ML) ORAL ONCE
Refills: 0 | Status: COMPLETED | OUTPATIENT
Start: 2025-04-17 | End: 2025-04-17

## 2025-04-17 RX ADMIN — Medication 400 MILLIGRAM(S): at 18:40

## 2025-04-17 RX ADMIN — Medication 650 MILLIGRAM(S): at 11:34

## 2025-04-17 RX ADMIN — Medication 2.5 MILLIGRAM(S): at 02:46

## 2025-04-17 RX ADMIN — Medication 81 MILLIGRAM(S): at 11:34

## 2025-04-17 RX ADMIN — Medication 1000 MILLIGRAM(S): at 19:10

## 2025-04-17 RX ADMIN — Medication 1 DOSE(S): at 21:49

## 2025-04-17 RX ADMIN — Medication 2.5 MILLIGRAM(S): at 08:34

## 2025-04-17 RX ADMIN — Medication 25 GRAM(S): at 21:49

## 2025-04-17 RX ADMIN — Medication 2.5 MILLIGRAM(S): at 20:47

## 2025-04-17 RX ADMIN — Medication 1 TABLET(S): at 11:34

## 2025-04-17 RX ADMIN — LIDOCAINE HYDROCHLORIDE 1 PATCH: 20 JELLY TOPICAL at 23:43

## 2025-04-17 RX ADMIN — LIDOCAINE HYDROCHLORIDE 1 PATCH: 20 JELLY TOPICAL at 11:35

## 2025-04-17 RX ADMIN — HEPARIN SODIUM 5000 UNIT(S): 1000 INJECTION INTRAVENOUS; SUBCUTANEOUS at 21:48

## 2025-04-17 RX ADMIN — Medication 200 GRAM(S): at 18:58

## 2025-04-17 RX ADMIN — Medication 1 DOSE(S): at 08:46

## 2025-04-17 RX ADMIN — Medication 10 MILLIGRAM(S): at 11:31

## 2025-04-17 RX ADMIN — Medication 4 MILLILITER(S): at 08:34

## 2025-04-17 RX ADMIN — Medication 1 APPLICATION(S): at 11:36

## 2025-04-17 RX ADMIN — LIDOCAINE HYDROCHLORIDE 1 PATCH: 20 JELLY TOPICAL at 19:38

## 2025-04-17 RX ADMIN — HEPARIN SODIUM 5000 UNIT(S): 1000 INJECTION INTRAVENOUS; SUBCUTANEOUS at 05:46

## 2025-04-17 RX ADMIN — ATORVASTATIN CALCIUM 80 MILLIGRAM(S): 80 TABLET, FILM COATED ORAL at 21:49

## 2025-04-17 RX ADMIN — METOPROLOL SUCCINATE 50 MILLIGRAM(S): 50 TABLET, EXTENDED RELEASE ORAL at 05:45

## 2025-04-17 RX ADMIN — SALINE 1 ENEMA: 7; 19 ENEMA RECTAL at 13:24

## 2025-04-17 RX ADMIN — Medication 10 MILLIGRAM(S): at 11:33

## 2025-04-17 RX ADMIN — TAMSULOSIN HYDROCHLORIDE 0.4 MILLIGRAM(S): 0.4 CAPSULE ORAL at 11:35

## 2025-04-17 RX ADMIN — CLOPIDOGREL BISULFATE 75 MILLIGRAM(S): 75 TABLET, FILM COATED ORAL at 11:33

## 2025-04-17 RX ADMIN — Medication 4 MILLILITER(S): at 20:47

## 2025-04-18 LAB
ANION GAP SERPL CALC-SCNC: 12 MMOL/L — SIGNIFICANT CHANGE UP (ref 7–14)
BUN SERPL-MCNC: 25 MG/DL — HIGH (ref 7–23)
CALCIUM SERPL-MCNC: 9.1 MG/DL — SIGNIFICANT CHANGE UP (ref 8.4–10.5)
CHLORIDE SERPL-SCNC: 101 MMOL/L — SIGNIFICANT CHANGE UP (ref 98–107)
CO2 SERPL-SCNC: 23 MMOL/L — SIGNIFICANT CHANGE UP (ref 22–31)
CREAT SERPL-MCNC: 1.31 MG/DL — HIGH (ref 0.5–1.3)
EGFR: 57 ML/MIN/1.73M2 — LOW
EGFR: 57 ML/MIN/1.73M2 — LOW
GLUCOSE SERPL-MCNC: 116 MG/DL — HIGH (ref 70–99)
HCT VFR BLD CALC: 33.7 % — LOW (ref 39–50)
HGB BLD-MCNC: 12 G/DL — LOW (ref 13–17)
MAGNESIUM SERPL-MCNC: 1.8 MG/DL — SIGNIFICANT CHANGE UP (ref 1.6–2.6)
MCHC RBC-ENTMCNC: 35.6 G/DL — SIGNIFICANT CHANGE UP (ref 32–36)
MCHC RBC-ENTMCNC: 36 PG — HIGH (ref 27–34)
MCV RBC AUTO: 101.2 FL — HIGH (ref 80–100)
NRBC # BLD AUTO: 0 K/UL — SIGNIFICANT CHANGE UP (ref 0–0)
NRBC # FLD: 0 K/UL — SIGNIFICANT CHANGE UP (ref 0–0)
NRBC BLD AUTO-RTO: 0 /100 WBCS — SIGNIFICANT CHANGE UP (ref 0–0)
PHOSPHATE SERPL-MCNC: 3.2 MG/DL — SIGNIFICANT CHANGE UP (ref 2.5–4.5)
PLATELET # BLD AUTO: 114 K/UL — LOW (ref 150–400)
POTASSIUM SERPL-MCNC: 4.1 MMOL/L — SIGNIFICANT CHANGE UP (ref 3.5–5.3)
POTASSIUM SERPL-SCNC: 4.1 MMOL/L — SIGNIFICANT CHANGE UP (ref 3.5–5.3)
RBC # BLD: 3.33 M/UL — LOW (ref 4.2–5.8)
RBC # FLD: 13.2 % — SIGNIFICANT CHANGE UP (ref 10.3–14.5)
SODIUM SERPL-SCNC: 136 MMOL/L — SIGNIFICANT CHANGE UP (ref 135–145)
SURGICAL PATHOLOGY STUDY: SIGNIFICANT CHANGE UP
WBC # BLD: 13.13 K/UL — HIGH (ref 3.8–10.5)
WBC # FLD AUTO: 13.13 K/UL — HIGH (ref 3.8–10.5)

## 2025-04-18 PROCEDURE — 71045 X-RAY EXAM CHEST 1 VIEW: CPT | Mod: 26

## 2025-04-18 RX ADMIN — LIDOCAINE HYDROCHLORIDE 1 PATCH: 20 JELLY TOPICAL at 19:32

## 2025-04-18 RX ADMIN — METOPROLOL SUCCINATE 50 MILLIGRAM(S): 50 TABLET, EXTENDED RELEASE ORAL at 05:21

## 2025-04-18 RX ADMIN — CLOPIDOGREL BISULFATE 75 MILLIGRAM(S): 75 TABLET, FILM COATED ORAL at 13:00

## 2025-04-18 RX ADMIN — Medication 81 MILLIGRAM(S): at 13:00

## 2025-04-18 RX ADMIN — HEPARIN SODIUM 5000 UNIT(S): 1000 INJECTION INTRAVENOUS; SUBCUTANEOUS at 05:21

## 2025-04-18 RX ADMIN — TAMSULOSIN HYDROCHLORIDE 0.4 MILLIGRAM(S): 0.4 CAPSULE ORAL at 13:00

## 2025-04-18 RX ADMIN — ATORVASTATIN CALCIUM 80 MILLIGRAM(S): 80 TABLET, FILM COATED ORAL at 21:26

## 2025-04-18 RX ADMIN — Medication 1 DOSE(S): at 21:26

## 2025-04-18 RX ADMIN — Medication 4 MILLILITER(S): at 20:19

## 2025-04-18 RX ADMIN — Medication 1 APPLICATION(S): at 13:07

## 2025-04-18 RX ADMIN — Medication 2.5 MILLIGRAM(S): at 15:23

## 2025-04-18 RX ADMIN — HEPARIN SODIUM 5000 UNIT(S): 1000 INJECTION INTRAVENOUS; SUBCUTANEOUS at 21:26

## 2025-04-18 RX ADMIN — Medication 25 GRAM(S): at 05:20

## 2025-04-18 RX ADMIN — Medication 1 TABLET(S): at 12:59

## 2025-04-18 RX ADMIN — LIDOCAINE HYDROCHLORIDE 1 PATCH: 20 JELLY TOPICAL at 13:01

## 2025-04-18 RX ADMIN — Medication 25 GRAM(S): at 21:27

## 2025-04-18 RX ADMIN — Medication 650 MILLIGRAM(S): at 12:59

## 2025-04-18 RX ADMIN — Medication 25 GRAM(S): at 13:14

## 2025-04-18 RX ADMIN — Medication 10 MILLIGRAM(S): at 12:59

## 2025-04-18 RX ADMIN — HEPARIN SODIUM 5000 UNIT(S): 1000 INJECTION INTRAVENOUS; SUBCUTANEOUS at 13:09

## 2025-04-18 RX ADMIN — Medication 2.5 MILLIGRAM(S): at 20:18

## 2025-04-18 RX ADMIN — Medication 1 DOSE(S): at 09:53

## 2025-04-18 RX ADMIN — FINASTERIDE 5 MILLIGRAM(S): 1 TABLET, FILM COATED ORAL at 12:59

## 2025-04-18 RX ADMIN — Medication 2.5 MILLIGRAM(S): at 02:11

## 2025-04-19 VITALS
RESPIRATION RATE: 18 BRPM | SYSTOLIC BLOOD PRESSURE: 113 MMHG | OXYGEN SATURATION: 96 % | DIASTOLIC BLOOD PRESSURE: 71 MMHG | HEART RATE: 75 BPM | TEMPERATURE: 98 F

## 2025-04-19 LAB
ANION GAP SERPL CALC-SCNC: 10 MMOL/L — SIGNIFICANT CHANGE UP (ref 7–14)
BUN SERPL-MCNC: 19 MG/DL — SIGNIFICANT CHANGE UP (ref 7–23)
CALCIUM SERPL-MCNC: 8.8 MG/DL — SIGNIFICANT CHANGE UP (ref 8.4–10.5)
CHLORIDE SERPL-SCNC: 105 MMOL/L — SIGNIFICANT CHANGE UP (ref 98–107)
CO2 SERPL-SCNC: 23 MMOL/L — SIGNIFICANT CHANGE UP (ref 22–31)
CREAT SERPL-MCNC: 1.22 MG/DL — SIGNIFICANT CHANGE UP (ref 0.5–1.3)
EGFR: 62 ML/MIN/1.73M2 — SIGNIFICANT CHANGE UP
EGFR: 62 ML/MIN/1.73M2 — SIGNIFICANT CHANGE UP
GLUCOSE SERPL-MCNC: 125 MG/DL — HIGH (ref 70–99)
HCT VFR BLD CALC: 33.9 % — LOW (ref 39–50)
HGB BLD-MCNC: 11.6 G/DL — LOW (ref 13–17)
MAGNESIUM SERPL-MCNC: 2 MG/DL — SIGNIFICANT CHANGE UP (ref 1.6–2.6)
MCHC RBC-ENTMCNC: 34.2 G/DL — SIGNIFICANT CHANGE UP (ref 32–36)
MCHC RBC-ENTMCNC: 36.1 PG — HIGH (ref 27–34)
MCV RBC AUTO: 105.6 FL — HIGH (ref 80–100)
NRBC # BLD AUTO: 0 K/UL — SIGNIFICANT CHANGE UP (ref 0–0)
NRBC # FLD: 0 K/UL — SIGNIFICANT CHANGE UP (ref 0–0)
NRBC BLD AUTO-RTO: 0 /100 WBCS — SIGNIFICANT CHANGE UP (ref 0–0)
PHOSPHATE SERPL-MCNC: 3.2 MG/DL — SIGNIFICANT CHANGE UP (ref 2.5–4.5)
PLATELET # BLD AUTO: 120 K/UL — LOW (ref 150–400)
POTASSIUM SERPL-MCNC: 3.7 MMOL/L — SIGNIFICANT CHANGE UP (ref 3.5–5.3)
POTASSIUM SERPL-SCNC: 3.7 MMOL/L — SIGNIFICANT CHANGE UP (ref 3.5–5.3)
RBC # BLD: 3.21 M/UL — LOW (ref 4.2–5.8)
RBC # FLD: 13.2 % — SIGNIFICANT CHANGE UP (ref 10.3–14.5)
SODIUM SERPL-SCNC: 138 MMOL/L — SIGNIFICANT CHANGE UP (ref 135–145)
WBC # BLD: 8.54 K/UL — SIGNIFICANT CHANGE UP (ref 3.8–10.5)
WBC # FLD AUTO: 8.54 K/UL — SIGNIFICANT CHANGE UP (ref 3.8–10.5)

## 2025-04-19 PROCEDURE — 71045 X-RAY EXAM CHEST 1 VIEW: CPT | Mod: 26

## 2025-04-19 RX ORDER — OXYCODONE HYDROCHLORIDE 30 MG/1
1 TABLET ORAL
Qty: 30 | Refills: 0
Start: 2025-04-19

## 2025-04-19 RX ORDER — AMOXICILLIN AND CLAVULANATE POTASSIUM 500; 125 MG/1; MG/1
1 TABLET, FILM COATED ORAL
Qty: 14 | Refills: 0
Start: 2025-04-19 | End: 2025-04-25

## 2025-04-19 RX ORDER — TAMSULOSIN HYDROCHLORIDE 0.4 MG/1
1 CAPSULE ORAL
Qty: 0 | Refills: 0 | DISCHARGE
Start: 2025-04-19

## 2025-04-19 RX ORDER — SENNA 187 MG
2 TABLET ORAL
Qty: 0 | Refills: 0 | DISCHARGE
Start: 2025-04-19

## 2025-04-19 RX ORDER — SODIUM BICARBONATE 1 MEQ/ML
650 SYRINGE (ML) INTRAVENOUS DAILY
Refills: 0 | Status: DISCONTINUED | OUTPATIENT
Start: 2025-04-20 | End: 2025-04-19

## 2025-04-19 RX ORDER — FINASTERIDE 1 MG/1
1 TABLET, FILM COATED ORAL
Qty: 0 | Refills: 0 | DISCHARGE
Start: 2025-04-19

## 2025-04-19 RX ADMIN — Medication 2.5 MILLIGRAM(S): at 07:50

## 2025-04-19 RX ADMIN — LIDOCAINE HYDROCHLORIDE 1 PATCH: 20 JELLY TOPICAL at 00:36

## 2025-04-19 RX ADMIN — METOPROLOL SUCCINATE 50 MILLIGRAM(S): 50 TABLET, EXTENDED RELEASE ORAL at 05:49

## 2025-04-19 RX ADMIN — HEPARIN SODIUM 5000 UNIT(S): 1000 INJECTION INTRAVENOUS; SUBCUTANEOUS at 05:49

## 2025-04-19 RX ADMIN — DORNASE ALFA 2.5 MILLIGRAM(S): 1 SOLUTION RESPIRATORY (INHALATION) at 07:51

## 2025-04-19 RX ADMIN — Medication 1 DOSE(S): at 09:19

## 2025-04-19 RX ADMIN — Medication 2.5 MILLIGRAM(S): at 03:19

## 2025-04-19 RX ADMIN — Medication 25 GRAM(S): at 05:50

## 2025-04-21 RX ORDER — OXYCODONE HYDROCHLORIDE 30 MG/1
1 TABLET ORAL
Qty: 9 | Refills: 0
Start: 2025-04-21 | End: 2025-04-23

## 2025-04-22 LAB
CULTURE RESULTS: SIGNIFICANT CHANGE UP
CULTURE RESULTS: SIGNIFICANT CHANGE UP
SPECIMEN SOURCE: SIGNIFICANT CHANGE UP
SPECIMEN SOURCE: SIGNIFICANT CHANGE UP

## 2025-04-29 PROCEDURE — G0452: CPT | Mod: 26

## 2025-05-01 ENCOUNTER — APPOINTMENT (OUTPATIENT)
Dept: RADIOLOGY | Facility: HOSPITAL | Age: 75
End: 2025-05-01

## 2025-05-01 ENCOUNTER — RESULT REVIEW (OUTPATIENT)
Age: 75
End: 2025-05-01

## 2025-05-01 ENCOUNTER — OUTPATIENT (OUTPATIENT)
Dept: OUTPATIENT SERVICES | Facility: HOSPITAL | Age: 75
LOS: 1 days | End: 2025-05-01
Payer: MEDICARE

## 2025-05-01 ENCOUNTER — APPOINTMENT (OUTPATIENT)
Dept: THORACIC SURGERY | Facility: CLINIC | Age: 75
End: 2025-05-01
Payer: MEDICARE

## 2025-05-01 VITALS
OXYGEN SATURATION: 92 % | SYSTOLIC BLOOD PRESSURE: 136 MMHG | DIASTOLIC BLOOD PRESSURE: 71 MMHG | BODY MASS INDEX: 29.19 KG/M2 | WEIGHT: 186 LBS | HEIGHT: 67 IN | HEART RATE: 72 BPM | RESPIRATION RATE: 17 BRPM

## 2025-05-01 DIAGNOSIS — G89.18 OTHER ACUTE POSTPROCEDURAL PAIN: ICD-10-CM

## 2025-05-01 DIAGNOSIS — J18.9 PNEUMONIA, UNSPECIFIED ORGANISM: ICD-10-CM

## 2025-05-01 DIAGNOSIS — C34.92 MALIGNANT NEOPLASM OF UNSPECIFIED PART OF LEFT BRONCHUS OR LUNG: ICD-10-CM

## 2025-05-01 PROCEDURE — 71046 X-RAY EXAM CHEST 2 VIEWS: CPT | Mod: 26

## 2025-05-01 PROCEDURE — 99024 POSTOP FOLLOW-UP VISIT: CPT

## 2025-05-01 RX ORDER — GABAPENTIN 100 MG/1
100 CAPSULE ORAL 3 TIMES DAILY
Qty: 90 | Refills: 0 | Status: ACTIVE | COMMUNITY
Start: 2025-05-01 | End: 1900-01-01

## 2025-05-16 ENCOUNTER — NON-APPOINTMENT (OUTPATIENT)
Age: 75
End: 2025-05-16

## 2025-05-16 ENCOUNTER — APPOINTMENT (OUTPATIENT)
Dept: CARDIOLOGY | Facility: CLINIC | Age: 75
End: 2025-05-16
Payer: MEDICARE

## 2025-05-16 VITALS
BODY MASS INDEX: 29.6 KG/M2 | OXYGEN SATURATION: 90 % | RESPIRATION RATE: 18 BRPM | SYSTOLIC BLOOD PRESSURE: 156 MMHG | HEART RATE: 64 BPM | WEIGHT: 189 LBS | DIASTOLIC BLOOD PRESSURE: 84 MMHG

## 2025-05-16 VITALS — DIASTOLIC BLOOD PRESSURE: 82 MMHG | SYSTOLIC BLOOD PRESSURE: 139 MMHG

## 2025-05-16 PROCEDURE — 99214 OFFICE O/P EST MOD 30 MIN: CPT

## 2025-05-16 PROCEDURE — G2211 COMPLEX E/M VISIT ADD ON: CPT

## 2025-05-16 RX ORDER — FUROSEMIDE 20 MG/1
20 TABLET ORAL
Qty: 30 | Refills: 0 | Status: ACTIVE | COMMUNITY
Start: 2025-05-16 | End: 1900-01-01

## 2025-05-19 PROBLEM — R60.9 EDEMA: Status: ACTIVE | Noted: 2025-05-16

## 2025-05-22 ENCOUNTER — APPOINTMENT (OUTPATIENT)
Dept: CARDIOLOGY | Facility: CLINIC | Age: 75
End: 2025-05-22
Payer: MEDICARE

## 2025-05-22 VITALS
WEIGHT: 181 LBS | BODY MASS INDEX: 29.09 KG/M2 | SYSTOLIC BLOOD PRESSURE: 111 MMHG | DIASTOLIC BLOOD PRESSURE: 76 MMHG | HEART RATE: 79 BPM | OXYGEN SATURATION: 97 % | RESPIRATION RATE: 18 BRPM | HEIGHT: 66.14 IN

## 2025-05-22 DIAGNOSIS — Z95.820 PERIPHERAL VASCULAR ANGIOPLASTY STATUS WITH IMPLANTS AND GRAFTS: ICD-10-CM

## 2025-05-22 DIAGNOSIS — I71.9 AORTIC ANEURYSM OF UNSPECIFIED SITE, W/OUT RUPTURE: ICD-10-CM

## 2025-05-22 DIAGNOSIS — R06.02 SHORTNESS OF BREATH: ICD-10-CM

## 2025-05-22 DIAGNOSIS — I25.10 ATHEROSCLEROTIC HEART DISEASE OF NATIVE CORONARY ARTERY W/OUT ANGINA PECTORIS: ICD-10-CM

## 2025-05-22 DIAGNOSIS — I10 ESSENTIAL (PRIMARY) HYPERTENSION: ICD-10-CM

## 2025-05-22 DIAGNOSIS — R60.9 EDEMA, UNSPECIFIED: ICD-10-CM

## 2025-05-22 PROCEDURE — 99214 OFFICE O/P EST MOD 30 MIN: CPT

## 2025-05-23 LAB
ALBUMIN SERPL ELPH-MCNC: 4 G/DL
ALP BLD-CCNC: 111 U/L
ALT SERPL-CCNC: 34 U/L
ANION GAP SERPL CALC-SCNC: 17 MMOL/L
AST SERPL-CCNC: 29 U/L
BILIRUB SERPL-MCNC: 1.1 MG/DL
BUN SERPL-MCNC: 23 MG/DL
CALCIUM SERPL-MCNC: 9.7 MG/DL
CHLORIDE SERPL-SCNC: 104 MMOL/L
CO2 SERPL-SCNC: 23 MMOL/L
CREAT SERPL-MCNC: 1.36 MG/DL
EGFRCR SERPLBLD CKD-EPI 2021: 55 ML/MIN/1.73M2
GLUCOSE SERPL-MCNC: 110 MG/DL
NT-PROBNP SERPL-MCNC: 75 PG/ML
POTASSIUM SERPL-SCNC: 3.9 MMOL/L
PROT SERPL-MCNC: 7.2 G/DL
SODIUM SERPL-SCNC: 143 MMOL/L

## 2025-07-24 ENCOUNTER — OUTPATIENT (OUTPATIENT)
Dept: OUTPATIENT SERVICES | Facility: HOSPITAL | Age: 75
LOS: 1 days | End: 2025-07-24
Payer: MEDICARE

## 2025-07-24 DIAGNOSIS — D70.9 NEUTROPENIA, UNSPECIFIED: ICD-10-CM

## 2025-07-24 DIAGNOSIS — Z98.890 OTHER SPECIFIED POSTPROCEDURAL STATES: Chronic | ICD-10-CM

## 2025-07-24 DIAGNOSIS — Z98.49 CATARACT EXTRACTION STATUS, UNSPECIFIED EYE: Chronic | ICD-10-CM

## 2025-07-24 DIAGNOSIS — Z95.5 PRESENCE OF CORONARY ANGIOPLASTY IMPLANT AND GRAFT: Chronic | ICD-10-CM

## 2025-07-24 LAB
ABO RH CONFIRMATION: SIGNIFICANT CHANGE UP
BLD GP AB SCN SERPL QL: SIGNIFICANT CHANGE UP

## 2025-07-24 PROCEDURE — 36415 COLL VENOUS BLD VENIPUNCTURE: CPT

## 2025-07-24 PROCEDURE — 86850 RBC ANTIBODY SCREEN: CPT

## 2025-07-24 PROCEDURE — 86900 BLOOD TYPING SEROLOGIC ABO: CPT

## 2025-07-24 PROCEDURE — 86901 BLOOD TYPING SEROLOGIC RH(D): CPT

## 2025-08-07 ENCOUNTER — APPOINTMENT (OUTPATIENT)
Dept: THORACIC SURGERY | Facility: CLINIC | Age: 75
End: 2025-08-07
Payer: MEDICARE

## 2025-08-07 VITALS
HEART RATE: 73 BPM | RESPIRATION RATE: 16 BRPM | BODY MASS INDEX: 29.41 KG/M2 | DIASTOLIC BLOOD PRESSURE: 67 MMHG | WEIGHT: 183 LBS | OXYGEN SATURATION: 90 % | SYSTOLIC BLOOD PRESSURE: 110 MMHG

## 2025-08-07 DIAGNOSIS — C34.92 MALIGNANT NEOPLASM OF UNSPECIFIED PART OF LEFT BRONCHUS OR LUNG: ICD-10-CM

## 2025-08-07 PROCEDURE — 99213 OFFICE O/P EST LOW 20 MIN: CPT

## 2025-08-22 ENCOUNTER — APPOINTMENT (OUTPATIENT)
Dept: CARDIOLOGY | Facility: CLINIC | Age: 75
End: 2025-08-22

## 2025-08-22 VITALS
SYSTOLIC BLOOD PRESSURE: 94 MMHG | HEART RATE: 74 BPM | BODY MASS INDEX: 29.57 KG/M2 | WEIGHT: 184 LBS | RESPIRATION RATE: 18 BRPM | DIASTOLIC BLOOD PRESSURE: 60 MMHG | OXYGEN SATURATION: 93 %

## 2025-08-22 DIAGNOSIS — Z95.820 PERIPHERAL VASCULAR ANGIOPLASTY STATUS WITH IMPLANTS AND GRAFTS: ICD-10-CM

## 2025-08-22 DIAGNOSIS — I71.9 AORTIC ANEURYSM OF UNSPECIFIED SITE, W/OUT RUPTURE: ICD-10-CM

## 2025-08-22 DIAGNOSIS — I10 ESSENTIAL (PRIMARY) HYPERTENSION: ICD-10-CM

## 2025-08-22 DIAGNOSIS — R60.9 EDEMA, UNSPECIFIED: ICD-10-CM

## 2025-08-22 DIAGNOSIS — I25.10 ATHEROSCLEROTIC HEART DISEASE OF NATIVE CORONARY ARTERY W/OUT ANGINA PECTORIS: ICD-10-CM

## 2025-08-22 DIAGNOSIS — K59.00 CONSTIPATION, UNSPECIFIED: ICD-10-CM

## 2025-08-22 PROCEDURE — 99214 OFFICE O/P EST MOD 30 MIN: CPT

## 2025-08-22 RX ORDER — SENNOSIDES 8.6 MG/1
8.6 TABLET ORAL DAILY
Qty: 60 | Refills: 5 | Status: ACTIVE | COMMUNITY
Start: 2025-08-22 | End: 1900-01-01

## (undated) DEVICE — XI OBTURATOR OPTICAL BLADELESS 8MM

## (undated) DEVICE — CHEST DRAIN PLEUR-EVAC DRY/WET ADULT-PEDS SINGLE (QUICK)

## (undated) DEVICE — PACK ROBOTIC

## (undated) DEVICE — XI ARM FORCEP FENESTRATED BIPOLAR 8MM

## (undated) DEVICE — XI ARM GRASPER TIP UP FENESTRATED

## (undated) DEVICE — DRSG TEGADERM 4 X 4.75"

## (undated) DEVICE — XI CORD BIPOLAR CAUTERY (BLUE)

## (undated) DEVICE — SYR LUER LOK 20CC

## (undated) DEVICE — SOL IRR POUR NS 0.9% 500ML

## (undated) DEVICE — Device

## (undated) DEVICE — ENDOCATCH GENERAL 10MM (PURPLE)

## (undated) DEVICE — ELCTR BOVIE TIP BLADE INSULATED 6.5" EDGE

## (undated) DEVICE — SUT SILK 2-0 30" SH

## (undated) DEVICE — XI ARM GRASPER BIPOLAR LONG 8MM

## (undated) DEVICE — ELCTR BOVIE PENCIL SMOKE EVACUATION

## (undated) DEVICE — TROCAR COVIDIEN VERSAPORT BLADELESS OPTICAL 15MM STANDARD

## (undated) DEVICE — DRSG MASTISOL

## (undated) DEVICE — DRSG CURITY GAUZE SPONGE 4 X 4" 12-PLY

## (undated) DEVICE — DRSG GAUZE PACKTNER ROLL

## (undated) DEVICE — ELCTR BOVIE TIP BLADE INSULATED 2.75" EDGE

## (undated) DEVICE — NDL HYPO SAFE 22G X 1.5" (BLACK)

## (undated) DEVICE — BLADE SURGICAL #10 STAINLESS

## (undated) DEVICE — TUBING OLYMPUS INSUFFLATION

## (undated) DEVICE — MARKING PEN W RULER

## (undated) DEVICE — GRASPER LAPA 5MMX35CM

## (undated) DEVICE — SUT PROLENE 0 30" CT-1

## (undated) DEVICE — TUBING STRYKEFLOW II SUCTION / IRRIGATOR

## (undated) DEVICE — XI SEAL UNIVERSIAL 5-12MM

## (undated) DEVICE — SUT VICRYL 0 27" UR-6

## (undated) DEVICE — VENODYNE/SCD SLEEVE CALF MEDIUM

## (undated) DEVICE — D HELP - CLEARVIEW CLEARIFY SYSTEM

## (undated) DEVICE — ELECTRO LUBE ANTI-STICK SOLUTION FOR CAUTERY TIP

## (undated) DEVICE — CHEST DRAIN OASIS DRY SUCTION WATER SEAL

## (undated) DEVICE — STAPLER COVIDIEN ENDO GIA STANDARD HANDLE

## (undated) DEVICE — GOWN XL